# Patient Record
Sex: FEMALE | Race: WHITE | Employment: UNEMPLOYED | URBAN - METROPOLITAN AREA
[De-identification: names, ages, dates, MRNs, and addresses within clinical notes are randomized per-mention and may not be internally consistent; named-entity substitution may affect disease eponyms.]

---

## 2020-03-21 PROBLEM — M96.1 LUMBAR POST-LAMINECTOMY SYNDROME: Status: ACTIVE | Noted: 2020-03-21

## 2020-10-21 ENCOUNTER — HOSPITAL ENCOUNTER (OUTPATIENT)
Dept: PHYSICAL THERAPY | Age: 60
Setting detail: THERAPIES SERIES
Discharge: HOME | End: 2020-10-21
Payer: MEDICAID

## 2020-10-21 PROCEDURE — 97110 THERAPEUTIC EXERCISES: CPT

## 2020-10-21 PROCEDURE — 97140 MANUAL THERAPY 1/> REGIONS: CPT

## 2020-10-21 PROCEDURE — 97162 PT EVAL MOD COMPLEX 30 MIN: CPT

## 2020-10-21 PROCEDURE — 97530 THERAPEUTIC ACTIVITIES: CPT

## 2020-10-21 ASSESSMENT — PAIN SCALES - QUEBEC BACK PAIN DISABILITY SCALE
QUEBEC CMS MODIFIER: CJ
RUN ONE BLOCK OR 100M: 5
QUEBEC DISABILITY INDEX: 20-39%
REACH UP TO HIGH SHELVES: 0
CLIMB ONE FLIGHT OF STAIRS: 1
TAKE FOOD OUT OF THE REFRIGERATOR: 0
LIFT AND CARRY A HEAVY SUITCASE: 1
MOVE A CHAIR: 1
SIT IN A CHAIR FOR SEVERAL HOURS: 1
THROW A BALL: 0
SLEEP THROUGH THE NIGHT: 0
STAND UP FOR 20 TO 30 MINUTES: 2
MAKE YOUR BED: 0
RIDE IN A CAR: 0
CARRY TWO BAGS OF GROCERIES: 1
WALK A FEW BLOCKS OR 300 TO 400M: 4
BEND OVER TO CLEAN THE BATHTUB: 2
TOTAL SCORE: 22
PUT ON SOCKS OR PANYHOSE: 1
WALK SEVERAL KILOMETERS  OR MILES: 1
TURN OVER IN BED: 1
PULL OR PUSH HEAVY DOORS: 0
GET OUT OF BED: 1

## 2020-10-21 NOTE — FLOWSHEET NOTE
Discussed purpose, risks and benefits of PT with pt who is agreeable to POC. Home Exercise Program:    Instructed patient on an HEP. Patient demonstrated exercises correctly. Handout with pictures and # of reps/sets was given. Exercises are listed above. Manual Treatments:   STM to lower Lumbar paraspinals- mainly R  Lumbar PA Mobilizations  B Hip Distraction Mobilization    Modalities:     Progression Towards Functional goals:  [] Patient is progressing as expected towards functional goals listed. [] Progression is slowed due to complexities listed. [] Progression has been slowed due to co-morbidities. [x] Plan just implemented, too soon to assess goals progression  [] Other:    Charges: Therapeutic Exercise:  [x] (26965) Provided verbal/tactile cueing for activities to restore or maintain strength, flexibility, endurance, ROM for improvements with self-care, mobility, lifting and ambulation. Neuromuscular Re-Education  [] (80316) Provided verbal/tactile cueing for activities to restore or maintain balance, coordination, kinesthetic sense, posture, motor skill, proprioception for self-care, mobility, lifting, and ambulation. Therapeutic Activities:    [x] (73296) Provided verbal/tactile cueing to address functional limitations related to loss of mobility, strength, balance, and coordination.      Gait Training:  [] (18273) Provided training and instruction to the patient for proper postural muscle recruitment and positioning with ambulation re-education     Home Exercise Program:    [x] (96432) Reviewed/Progressed HEP activities related to strengthening, flexibility, endurance, ROM for functional self-care, mobility, lifting and ambulation   [] (86957) Reviewed/Progressed HEP activities related to improving balance, coordination, kinesthetic sense, posture, motor skill, proprioception for self-care, mobility, lifting, and ambulation      Manual Treatments:  Michele Dias / Amina Mccann / RobbyMobs: G-I, II, III, IV / Manipulation / MLD  [x] (22350) Provided manual therapy to mobilize  soft tissue/joints/fluid for the purpose of modulating pain, promoting relaxation, increasing ROM, reducing/eliminating soft tissue swelling/inflammation/restriction, improving soft tissue extensibility and allowing for proper ROM for normal function with self- care, mobility, lifting and ambulation. Timed Code Treatment Minutes: 39   Total Treatment Minutes: 60     [] EVAL (LOW) 02450   [x] EVAL (MOD) 36938   [] EVAL (HIGH) 36991   [] RE-EVAL   [x] TE (39289) x     [] Aquatic (44828) x  [] NMR (54942)   x  [] Aquatic Group (60491) x  [x] Manual (02564) x    [] Ultrasound (37210) x  [x] TA (79944) x  [] Mech Traction (85877)  [] Ionto (68325)           [] ES (un) (36643):   [] Vasopump (40738) [] Other:      Assessment  [x] Patient tolerated treatment well [] Patient limited by fatigue  [] Patient limited by pain  [] Patient limited by other medical complications  [] Other:     Prognosis: [x] Good [] Fair  [] Poor    Goals:    Short term goals  Time Frame for Short term goals: 3 Weeks  Short term goal 1: Pt will show independence in HEP  Short term goal 2: Pt will show pain free lumbar flexion AROM to make ADLs easier      Long term goals  Time Frame for Long term goals : 6 Weeks  Long term goal 1: Pt will report  <3/10 back pain consistently so she can return to walking 4 blocks around the neighborhood and complete all ADLs     Patient Requires Follow-up: [] Yes  [] No    Plan:   [] Continue per plan of care [] Alter current plan (see comments)  [x] Plan of care initiated [] Hold pending MD visit [] Discharge  Note: If patient does not return for scheduled/ recommended follow up visits, this note will serve as a discharge from care along with most recent update on progress.     Plan for Next Session:      Electronically signed by:  Raimundo Lucia, PT

## 2020-10-21 NOTE — PROGRESS NOTES
Physical Therapy  Initial Assessment  Date: 10/21/2020  Patient Name: Franklin Lyn  MRN: [id number]  : 1960     Treatment Diagnosis: Pain. Muscle tightness    Restrictions  Fall Risk- low    Subjective   General  Chart Reviewed: Yes  Referring Practitioner: Dr. Marion Louis  Referral Date : 20  Diagnosis: M54.9 (ICD-10-CM) - Back pain, unspecified back location, unspecified back pain laterality, unspecified chronicity  PT Visit Information  Onset Date: 10/10/12  PT Insurance Information: [address]  Subjective  Subjective: Pt states she has a history of back pain. Had back surgery in  due to ruptured disc that was pushing on the disc. States after that she was doing great within 3 months. Pt has worked last 8 years in [employer] doing [address] [employer] work and back pain has progressively worsened for the period of time. Had MRI that showed two bulging discs, thinning of a disk, OA, and slight scoliosis and DDD. Pain is 5/10. States she used to be able to walk around the neighborhood and now can only walk a block before the pain starts. Pain is in her low back and into the bottom of her butt on both sides. Also gets some pain in the front, side of his hips. Denies bowel or bladder problems. Denies LE weakness. Goal is to strengthen bones and tendons. Walking aggravates the back the most, sitting seems to help the back pain.      Vision/Hearing  Vision  Vision: Within Functional Limits  Hearing  Hearing: Within functional limits    Orientation  Orientation  Overall Orientation Status: Within Normal Limits    Objective     Observation/Palpation  Palpation: Mild tenderness  Observation: Pt ambulating with normal gait    AROM RLE (degrees)  RLE AROM: WNL  AROM LLE (degrees)  LLE AROM : WNL  Spine  Lumbar: AROM: WNL *Mild to moderate pain with lumbar flexion, pt reported \"felt good\" with extension  Special Tests: LE DTR: WNL  Joint Mobility  Spine: Pelvic Alignment and Leg length are WNL    Strength RLE  Strength RLE: WNL  Strength LLE  Strength LLE: WNL     Additional Measures  Flexibility: 90/90 HS: Moderate tightness bilaterally  Sensation  Overall Sensation Status: WNL           Assessment   Conditions Requiring Skilled Therapeutic Intervention  Body structures, Functions, Activity limitations: Decreased functional mobility ; Decreased ADL status; Decreased ROM; Increased pain  Assessment: PLOF- mild back pain that is not affecting ADLs. CLOF- moderate amounts of low back pain affecting ability to stand or walk for any period of time to complete ADLs  Treatment Diagnosis: Pain.  Muscle tightness  Prognosis: Good  Decision Making: Medium Complexity  REQUIRES PT FOLLOW UP: Yes         Plan   Plan  Times per week: 2  Times per day: Daily  Plan weeks: 6  Current Treatment Recommendations: Strengthening, Home Exercise Program, ROM, Manual Therapy - Soft Tissue Mobilization, Manual Therapy - Joint Manipulation, Pain Management    OutComes Score  [place] Total Score: 22 (10/21/20 1606)                                             Goals  Short term goals  Time Frame for Short term goals: 3 Weeks  Short term goal 1: Pt will show independence in HEP  Short term goal 2: Pt will show pain free lumbar flexion AROM to make ADLs easier  Long term goals  Time Frame for Long term goals : 6 Weeks  Long term goal 1: Pt will report  <3/10 back pain consistently so she can return to walking 4 blocks around the neighborhood and complete all ADLs  Patient Goals   Patient goals : \"to help strengthen bones or tendons, prevent need for surgery\"       Therapy Time   Individual Concurrent Group Co-treatment   Time In           Time Out           Minutes                   Naukl cruz, PT

## 2020-10-21 NOTE — PLAN OF CARE
Outpatient Physical Therapy  [] [facility]    Phone: [phone]   Fax: [phone]   [x] [facility]  Phone: [phone]              Fax: [phone]  [] [facility]   Phone: [phone]   Fax: [phone]     To: Referring Practitioner: Dr. Alvino Weiner      Patient: Debbie Leary   : 1960   MRN: [id number]  Evaluation Date: 10/21/2020      Diagnosis Information:  · Diagnosis: M54.9 (ICD-10-CM) - Back pain, unspecified back location, unspecified back pain laterality, unspecified chronicity   · Treatment Diagnosis: Pain. Muscle tightness     Physical Therapy Certification/Re-Certification Form  Dear Dr. Alvino Weiner,  The following patient has been evaluated for physical therapy services and for therapy to continue, Medicare requires monthly physician review of the treatment plan. Please review the attached evaluation and/or summary of the patient's plan of care, and verify that you agree therapy should continue by signing the attached document and sending it back to our office. Plan of Care/Treatment to date:  [x] Therapeutic Exercise    [x] Modalities:  [x] Therapeutic Activity     [x] Ultrasound  [] Electrical Stimulation  [] Gait Training      [] Cervical Traction [] Lumbar Traction  [] Neuromuscular Re-education    [x] Cold/hotpack [] Iontophoresis   [x] Instruction in HEP     Other:  [x] Manual Therapy      []             [] Aquatic Therapy      []           ? Frequency/Duration:  # Days per week: [] 1 day # Weeks: [] 1 week [] 5 weeks     [x] 2 days? [] 2 weeks [x] 6 weeks     [] 3 days   [] 3 weeks [] 7 weeks     [] 4 days   [] 4 weeks [] 8 weeks    Rehab Potential: [] Excellent [x] Good [] Fair  [] Poor       Electronically signed by:  Meliza Live PT      If you have any questions or concerns, please don't hesitate to call.   Thank you for your referral.      Physician Signature:________________________________Date:__________________  By signing above, therapists plan is approved by physician

## 2020-10-28 ENCOUNTER — HOSPITAL ENCOUNTER (OUTPATIENT)
Dept: PHYSICAL THERAPY | Age: 60
Setting detail: THERAPIES SERIES
Discharge: HOME | End: 2020-10-28
Payer: MEDICAID

## 2020-10-28 PROCEDURE — 97110 THERAPEUTIC EXERCISES: CPT

## 2020-10-28 PROCEDURE — 97140 MANUAL THERAPY 1/> REGIONS: CPT

## 2020-10-28 NOTE — FLOWSHEET NOTE
10\"    Piriformis Stretch 3 x 30\" B                        HEP     SKTC, Piriformis, and HS Stretch      PPT     PPT with [facility]  10/28                    Other Therapeutic Activities:      Home Exercise Program:      Manual Treatments:   STM to lower Lumbar paraspinals- mainly R  Lumbar PA Mobilizations  B Hip Distraction Mobilization    Modalities:     Progression Towards Functional goals:  [] Patient is progressing as expected towards functional goals listed. [] Progression is slowed due to complexities listed. [] Progression has been slowed due to co-morbidities. [x] Plan just implemented, too soon to assess goals progression  [] Other:    Charges: Therapeutic Exercise:  [x] (19114) Provided verbal/tactile cueing for activities to restore or maintain strength, flexibility, endurance, ROM for improvements with self-care, mobility, lifting and ambulation. Neuromuscular Re-Education  [] (43529) Provided verbal/tactile cueing for activities to restore or maintain balance, coordination, kinesthetic sense, posture, motor skill, proprioception for self-care, mobility, lifting, and ambulation. Therapeutic Activities:    [x] (30820) Provided verbal/tactile cueing to address functional limitations related to loss of mobility, strength, balance, and coordination.      Gait Training:  [] (89134) Provided training and instruction to the patient for proper postural muscle recruitment and positioning with ambulation re-education     Home Exercise Program:    [x] (34917) Reviewed/Progressed HEP activities related to strengthening, flexibility, endurance, ROM for functional self-care, mobility, lifting and ambulation   [] (26816) Reviewed/Progressed HEP activities related to improving balance, coordination, kinesthetic sense, posture, motor skill, proprioception for self-care, mobility, lifting, and ambulation      Manual Treatments:  MFR / STM / Oscillations-Mobs:  G-I, II, III, IV / Manipulation / MLD  [x] (10397) Provided manual therapy to mobilize  soft tissue/joints/fluid for the purpose of modulating pain, promoting relaxation, increasing ROM, reducing/eliminating soft tissue swelling/inflammation/restriction, improving soft tissue extensibility and allowing for proper ROM for normal function with self- care, mobility, lifting and ambulation. Timed Code Treatment Minutes: 45   Total Treatment Minutes: 45     [] EVAL (LOW) 45720   [x] EVAL (MOD) 90593   [] EVAL (HIGH) 17791   [] RE-EVAL   [x] TE (53207) x     [] Aquatic (07127) x  [] NMR (75690)   x  [] Aquatic Group (08878) x  [x] Manual (18541) x    [] Ultrasound (92444) x  [x] TA (73487) x  [] Mech Traction (00970)  [] Ionto (60173)           [] ES (un) (59336):   [] Vasopump (91408) [] Other:      Assessment  [x] Patient tolerated treatment well [] Patient limited by fatigue  [] Patient limited by pain  [] Patient limited by other medical complications  [] Other:     Prognosis: [x] Good [] Fair  [] Poor    Goals:    Short term goals  Time Frame for Short term goals: 3 Weeks  Short term goal 1: Pt will show independence in HEP  Short term goal 2: Pt will show pain free lumbar flexion AROM to make ADLs easier      Long term goals  Time Frame for Long term goals : 6 Weeks  Long term goal 1: Pt will report  <3/10 back pain consistently so she can return to walking 4 blocks around the neighborhood and complete all ADLs     Patient Requires Follow-up: [] Yes  [] No    Plan:   [x] Continue per plan of care [] Alter current plan (see comments)  [] Plan of care initiated [] Hold pending MD visit [] Discharge  Note: If patient does not return for scheduled/ recommended follow up visits, this note will serve as a discharge from care along with most recent update on progress.     Plan for Next Session:    Manual Above  Core strengthening  Modalities as needed    Electronically signed by:  Patty Cornejo PT

## 2020-10-30 ENCOUNTER — HOSPITAL ENCOUNTER (OUTPATIENT)
Dept: PHYSICAL THERAPY | Age: 60
Setting detail: THERAPIES SERIES
Discharge: HOME | End: 2020-10-30
Payer: MEDICAID

## 2020-10-30 NOTE — FLOWSHEET NOTE
Physical Therapy  Cancellation/No-show Note  Patient Name:  Sonia Neal  :  1960   Date:  10/30/2020  Cancelled visits to date: 1  No-shows to date: 0    For today's appointment patient:  [x]  Cancelled  []  Rescheduled appointment  []  No-show     Reason given by patient:  [x]  Patient ill  []  Conflicting appointment  []  No transportation    []  Conflict with work  []  No reason given  []  Other:     Comments:      Electronically signed by:  Andrea Mendoza PTA

## 2020-11-03 ENCOUNTER — HOSPITAL ENCOUNTER (OUTPATIENT)
Dept: PHYSICAL THERAPY | Age: 60
Setting detail: THERAPIES SERIES
Discharge: HOME | End: 2020-11-03
Payer: MEDICAID

## 2020-11-03 PROCEDURE — 97140 MANUAL THERAPY 1/> REGIONS: CPT

## 2020-11-03 PROCEDURE — 97110 THERAPEUTIC EXERCISES: CPT

## 2020-11-03 NOTE — FLOWSHEET NOTE
Physical Therapy Daily Treatment Note  Date:  11/3/2020    Patient Name:  Tanvi Ochoa    :  1960  MRN: [id number]    Restrictions/Precautions:     Pertinent Medical History: HTN, Chronic Bronchitis  Medical/Treatment Diagnosis Information:  · Diagnosis: M54.9 (ICD-10-CM) - Back pain, unspecified back location, unspecified back pain laterality, unspecified chronicity  · Treatment Diagnosis: Pain. Muscle tightness    Insurance/Certification information:  PT Insurance Information: [address] (pending [facility] # [id number])  Physician Information:  Referring Practitioner: Dr. Emigdio Ortiz of care signed (Y/N):      Visit# / total visits:  3/12  Pain level: 3/10     Functional Outcomes Measure:  Test: [place]  Score:22 (Xenia Og)    Progress Note: []  Yes  []  No  Next due by: Visit #10      History of Injury:  Subjective: Pt states she has a history of back pain. Had back surgery in  due to ruptured disc that was pushing on the disc. States after that she was doing great within 3 months. Pt has worked last 8 years in [employer] doing [address] work and back pain has progressively worsened for the period of time. Had MRI that showed two bulging discs, thinning of a disk, OA, and slight scoliosis and DDD. Pain is 5/10. States she used to be able to walk around the neighborhood and now can only walk a block before the pain starts. Pain is in her low back and into the bottom of her butt on both sides. Also gets some pain in the front, side of his hips. Denies bowel or bladder problems. Denies LE weakness. Goal is to strengthen bones and tendons. Walking aggravates the back the most, sitting seems to help the back pain. Subjective:     10/28/20: States she is feeling a little better, exercises seem to be helping. States she was able to walk around the block. 20: Patient reports she is able to walk a little longer before her symptoms in her back increases.     Objective:   Observation:    Test self-care, mobility, lifting, and ambulation      Manual Treatments:  MFR / STM / Oscillations-Mobs:  G-I, II, III, IV / Manipulation / MLD  [x] (73115) Provided manual therapy to mobilize  soft tissue/joints/fluid for the purpose of modulating pain, promoting relaxation, increasing ROM, reducing/eliminating soft tissue swelling/inflammation/restriction, improving soft tissue extensibility and allowing for proper ROM for normal function with self- care, mobility, lifting and ambulation. Timed Code Treatment Minutes: 45   Total Treatment Minutes: 45     [] EVAL (LOW) 86945   [] EVAL (MOD) 26425   [] EVAL (HIGH) 04196   [] RE-EVAL   [x] TE (50795) x 1    [] Aquatic (39784) x  [] NMR (71876)   x  [] Aquatic Group (75101) x  [x] Manual (71720) x 2   [] Ultrasound (34796) x  [] TA (37368) x  [] Mech Traction (29702)  [] Ionto (89713)           [] ES (un) (42363):   [] Vasopump (00840) [] Other:      Assessment  [x] Patient tolerated treatment well [] Patient limited by fatigue  [] Patient limited by pain  [] Patient limited by other medical complications  [] Other:     Prognosis: [x] Good [] Fair  [] Poor    Goals:    Short term goals  Time Frame for Short term goals: 3 Weeks  Short term goal 1: Pt will show independence in HEP  Short term goal 2: Pt will show pain free lumbar flexion AROM to make ADLs easier      Long term goals  Time Frame for Long term goals : 6 Weeks  Long term goal 1: Pt will report  <3/10 back pain consistently so she can return to walking 4 blocks around the neighborhood and complete all ADLs     Patient Requires Follow-up: [] Yes  [] No    Plan:   [x] Continue per plan of care [] Alter current plan (see comments)  [] Plan of care initiated [] Hold pending MD visit [] Discharge  Note: If patient does not return for scheduled/ recommended follow up visits, this note will serve as a discharge from care along with most recent update on progress.     Plan for Next Session:    Manual Above  Core strengthening  Modalities as needed    Electronically signed by:  Aurora Dunne, PTA

## 2020-12-08 ENCOUNTER — HOSPITAL ENCOUNTER (EMERGENCY)
Age: 60
Discharge: HOME | End: 2020-12-09
Attending: EMERGENCY MEDICINE
Payer: MEDICAID

## 2020-12-08 PROCEDURE — 99283 EMERGENCY DEPT VISIT LOW MDM: CPT

## 2020-12-08 RX ORDER — DIAZEPAM 5 MG/1
5 TABLET ORAL EVERY 6 HOURS PRN
COMMUNITY
End: 2022-02-21

## 2020-12-09 ENCOUNTER — APPOINTMENT (OUTPATIENT)
Dept: CT IMAGING | Age: 60
End: 2020-12-09
Payer: MEDICAID

## 2020-12-09 ENCOUNTER — APPOINTMENT (OUTPATIENT)
Dept: GENERAL RADIOLOGY | Age: 60
End: 2020-12-09
Payer: MEDICAID

## 2020-12-09 VITALS
SYSTOLIC BLOOD PRESSURE: 142 MMHG | OXYGEN SATURATION: 95 % | BODY MASS INDEX: 30.05 KG/M2 | RESPIRATION RATE: 17 BRPM | TEMPERATURE: 97.1 F | HEART RATE: 62 BPM | WEIGHT: 180.34 LBS | DIASTOLIC BLOOD PRESSURE: 60 MMHG | HEIGHT: 65 IN

## 2020-12-09 LAB
EKG ATRIAL RATE: 48 BPM
EKG DIAGNOSIS: NORMAL
EKG P AXIS: 30 DEGREES
EKG P-R INTERVAL: 164 MS
EKG Q-T INTERVAL: 436 MS
EKG QRS DURATION: 80 MS
EKG QTC CALCULATION (BAZETT): 389 MS
EKG R AXIS: 17 DEGREES
EKG T AXIS: 54 DEGREES
EKG VENTRICULAR RATE: 48 BPM

## 2020-12-09 PROCEDURE — 71045 X-RAY EXAM CHEST 1 VIEW: CPT

## 2020-12-09 PROCEDURE — 93010 ELECTROCARDIOGRAM REPORT: CPT | Performed by: INTERNAL MEDICINE

## 2020-12-09 PROCEDURE — 70450 CT HEAD/BRAIN W/O DYE: CPT

## 2020-12-09 PROCEDURE — 93005 ELECTROCARDIOGRAM TRACING: CPT | Performed by: EMERGENCY MEDICINE

## 2020-12-09 ASSESSMENT — ENCOUNTER SYMPTOMS
COLOR CHANGE: 0
SHORTNESS OF BREATH: 0
CONSTIPATION: 0
EYE ITCHING: 0
EYE DISCHARGE: 0
VOMITING: 0
COUGH: 0
ABDOMINAL PAIN: 0

## 2020-12-09 NOTE — ED PROVIDER NOTES
[address] [facility]      Pt Name: Marjan Hobbs  MRN: [id number]  [place] 1960  Date of evaluation: 12/8/2020  Provider: Karlos Lopez MD    CHIEF COMPLAINT       Chief Complaint   Patient presents with    Hypertension     pt with HTN at [facility]/  207/29.  systolic at home [phone] at home       [address] [facility]    Marjan Hobbs is a 64 y.o. female who presents to the emergency department with HTN. States noted her blood pressure in the 200s today with mild 1/10 dull headache. Took extra dose of her Metoprolol (total of 200mg) and came to ER. Has no complaints currently. Headaches resolved. Nursing Notes were reviewed. Including nursing noted for FM, Surgical History, Past Medical History, Social History, vitals, and allergies; agree with all. REVIEW OF SYSTEMS       Review of Systems   Constitutional: Negative for diaphoresis and unexpected weight change. HENT: Negative for congestion and dental problem. Eyes: Negative for discharge and itching. Respiratory: Negative for cough and shortness of breath. Cardiovascular: Negative for chest pain and leg swelling. Gastrointestinal: Negative for abdominal pain, constipation and vomiting. Endocrine: Negative for cold intolerance and heat intolerance. Genitourinary: Negative for vaginal bleeding, vaginal discharge and vaginal pain. Musculoskeletal: Negative for neck pain and neck stiffness. Skin: Negative for color change and pallor. Neurological: Negative for tremors and weakness. Psychiatric/Behavioral: Negative for agitation and behavioral problems. Except as noted above the remainder of the review of systems was reviewed and negative.      PAST MEDICAL HISTORY     Past Medical History:   Diagnosis Date    Hypertension     Wears glasses     reading       SURGICAL HISTORY       Past Surgical History:   Procedure Laterality Date    BACK SURGERY  1998    ruptured DAYS FOLLOWED BY 1 TAB TWICE DAILY X4 DAYS FOLLOWED BY 2 TABS TWICE DAILY, Disp-56 tablet, R-1Normal       !! - Potential duplicate medications found. Please discuss with provider. ALLERGIES     Patient has no known allergies.     FAMILY HISTORY        Family History   Problem Relation Age of Onset    Cancer Mother         colon    Early Death Father         killed in a fire       SOCIAL HISTORY       Social History     Socioeconomic History    Marital status: Single     Spouse name: Not on file    Number of children: Not on file    Years of education: Not on file    Highest education level: Not on file   Occupational History    Not on file   Social Needs    Financial resource strain: Not on file    Food insecurity     Worry: Not on file     Inability: Not on file    Transportation needs     Medical: Not on file     Non-medical: Not on file   Tobacco Use    Smoking status: Current Every Day Smoker     Packs/day: 0.50     Years: 35.00     Pack years: 17.50    Smokeless tobacco: Never Used   Substance and Sexual Activity    Alcohol use: Yes     Comment: occ    Drug use: No    Sexual activity: Yes     Partners: Male   Lifestyle    Physical activity     Days per week: Not on file     Minutes per session: Not on file    Stress: Not on file   Relationships    Social connections     Talks on phone: Not on file     Gets together: Not on file     Attends Confucianist service: Not on file     Active member of club or organization: Not on file     Attends meetings of clubs or organizations: Not on file     Relationship status: Not on file    Intimate partner violence     Fear of current or ex partner: Not on file     Emotionally abused: Not on file     Physically abused: Not on file     Forced sexual activity: Not on file   Other Topics Concern    Not on file   Social History Narrative    Not on file       PHYSICAL EXAM       ED Triage Vitals   BP Temp Temp Source Pulse Resp SpO2 Height Weight 12/08/20 2251 12/08/20 2248 12/08/20 2248 12/08/20 2248 12/08/20 2248 12/08/20 2248 12/08/20 2245 12/08/20 2245   (!) 198/84 97.1 °F (36.2 °C) Tympanic 62 17 95 % 5' 5\" (1.651 m) 180 lb 5.4 oz (81.8 kg)       Physical Exam  Vitals signs and nursing note reviewed. Constitutional:       General: She is not in acute distress. Appearance: She is well-developed. She is not ill-appearing, toxic-appearing or diaphoretic. HENT:      Head: Normocephalic and atraumatic. Right Ear: External ear normal.      Left Ear: External ear normal.   Eyes:      General:         Right eye: No discharge. Left eye: No discharge. Conjunctiva/sclera: Conjunctivae normal.      Pupils: Pupils are equal, round, and reactive to light. Neck:      Musculoskeletal: Normal range of motion and neck supple. Cardiovascular:      Rate and Rhythm: Normal rate and regular rhythm. Heart sounds: No murmur. Pulmonary:      Effort: Pulmonary effort is normal. No respiratory distress. Breath sounds: Normal breath sounds. No wheezing or rales. Abdominal:      General: Bowel sounds are normal. There is no distension. Palpations: Abdomen is soft. There is no mass. Tenderness: There is no abdominal tenderness. There is no guarding or rebound. Genitourinary:     Comments: Deferred  Musculoskeletal: Normal range of motion. General: No deformity. Skin:     General: Skin is warm. Findings: No erythema or rash. Neurological:      Mental Status: She is alert and oriented to person, place, and time. She is not disoriented. Cranial Nerves: No cranial nerve deficit. Motor: No atrophy or abnormal muscle tone. Coordination: Coordination normal.   Psychiatric:         Behavior: Behavior normal.         Thought Content:  Thought content normal.         DIAGNOSTIC RESULTS     EKG: All EKG's are interpreted by the Emergency Department Physician who either signs or Co-signs this chart in the follow-up care as directed. Patient understands to return immediately for worsening/change in symptoms. CRITICAL CARE TIME   Total Critical Caretime was 21 minutes, excluding separately reportable procedures. There was a high probability of clinically significant/life threatening deterioration in the patient's condition which required my urgent intervention. PROCEDURES:  Unlessotherwise noted below, none    FINAL IMPRESSION      1.  Essential hypertension          DISPOSITION/PLAN   DISPOSITION Decision To Discharge 12/09/2020 01:00:36 AM    PATIENT REFERRED TO:  Tavon Aldrich MD  [address] [phone] [facility] [address]  [phone]    Call today        DISCHARGE MEDICATIONS:  Discharge Medication List as of 12/9/2020  1:20 AM             (Please note that portions ofthis note were completed with a voice recognition program.  Efforts were made to edit the dictations but occasionally words are mis-transcribed.)    Layla Marion MD(electronically signed)  Attending Emergency Physician        Layla Marion MD  12/09/20 [id number]

## 2020-12-30 ENCOUNTER — TELEPHONE (OUTPATIENT)
Dept: EMERGENCY DEPT | Age: 60
End: 2020-12-30

## 2020-12-30 NOTE — TELEPHONE ENCOUNTER
[facility] ([facility]) reviewed patient chart information before conducting a telephonic wellness check.

## 2022-01-03 ENCOUNTER — OFFICE VISIT (OUTPATIENT)
Dept: ORTHOPEDIC SURGERY | Age: 62
End: 2022-01-03
Payer: MEDICAID

## 2022-01-03 VITALS — WEIGHT: 185 LBS | HEIGHT: 65 IN | BODY MASS INDEX: 30.82 KG/M2

## 2022-01-03 DIAGNOSIS — M51.36 LUMBAR DEGENERATIVE DISC DISEASE: Primary | ICD-10-CM

## 2022-01-03 PROCEDURE — G8427 DOCREV CUR MEDS BY ELIG CLIN: HCPCS | Performed by: ORTHOPAEDIC SURGERY

## 2022-01-03 PROCEDURE — 99204 OFFICE O/P NEW MOD 45 MIN: CPT | Performed by: ORTHOPAEDIC SURGERY

## 2022-01-03 PROCEDURE — 4004F PT TOBACCO SCREEN RCVD TLK: CPT | Performed by: ORTHOPAEDIC SURGERY

## 2022-01-03 PROCEDURE — 3017F COLORECTAL CA SCREEN DOC REV: CPT | Performed by: ORTHOPAEDIC SURGERY

## 2022-01-03 PROCEDURE — G8417 CALC BMI ABV UP PARAM F/U: HCPCS | Performed by: ORTHOPAEDIC SURGERY

## 2022-01-03 PROCEDURE — G8484 FLU IMMUNIZE NO ADMIN: HCPCS | Performed by: ORTHOPAEDIC SURGERY

## 2022-01-03 NOTE — PROGRESS NOTES
New Patient: LUMBAR SPINE    Referring Provider:  Randolph Justin MD    CHIEF COMPLAINT:    Chief Complaint   Patient presents with    Back Pain     lumbar       HISTORY OF PRESENT ILLNESS:     Ms. Odin Peralta is a pleasant 63 y.o. female, s/p previous lumbar surgery in 1998 by Dr. Tran Ro, here for consultation regarding her LBP and right leg pain. She states her pain began insidiously about 12 years ago. Her pain has steadily increased since then. She rates her back pain 4/10 and leg pain 4/10. She describes the pain as aching. Pain is worse with prolonged sitting, standing, or walking, and improved some with lying down. The leg pain radiates down the posterior aspect of her leg to her right thigh. She notes intermittent numbness and tingling in her right foot. She denies weakness of her legs and denies saddle numbness or bowel or bladder dysfunction. The pain occasionally disrupts her sleep. She is in pain management with Dr. Dorota Quiñonez. She tried PT last year without relief. She notes multiple epidural injections in the past without long-lasting relief. She is currently in the process of being scheduled for aquatic therapy.     Current/Past Treatment:   · Physical Therapy: Yes without substantial improvement  · Chiropractic:  Yes, temporarily helpful  · Injection:  Multiple NATHALY without relief   · Medications:  Mobic, Oxycodone 10mg, Lyrica    Past Medical History:   Past Medical History:   Diagnosis Date    Hypertension     Wears glasses     reading        Past Surgical History:     Past Surgical History:   Procedure Laterality Date    BACK SURGERY  1998    ruptured disc    CHOLECYSTECTOMY, LAPAROSCOPIC  4/14/14       Current Medications:     Current Outpatient Medications:     meloxicam (MOBIC) 15 MG tablet, TAKE ONE TABLET BY MOUTH DAILY AS NEEDED FOR PAIN, Disp: 30 tablet, Rfl: 0    amLODIPine (NORVASC) 5 MG tablet, TAKE ONE TABLET BY MOUTH DAILY, Disp: 30 tablet, Rfl: 3    albuterol sulfate  (90 Base) MCG/ACT inhaler, INHALE 2 PUFF(S) BY MOUTH FOUR TIMES A DAY AS NEEDED FOR WHEEZING, Disp: 18 g, Rfl: 0    hydroCHLOROthiazide (HYDRODIURIL) 25 MG tablet, TAKE ONE TABLET BY MOUTH EVERY MORNING, Disp: 30 tablet, Rfl: 5    metoprolol succinate (TOPROL XL) 100 MG extended release tablet, TAKE ONE TABLET BY MOUTH DAILY, Disp: 90 tablet, Rfl: 1    vitamin D (ERGOCALCIFEROL) 1.25 MG (61834 UT) CAPS capsule, TAKE ONE CAPSULE BY MOUTH ONCE WEEKLY, Disp: 4 capsule, Rfl: 5    pregabalin (LYRICA) 150 MG capsule, , Disp: , Rfl:     tiotropium (SPIRIVA HANDIHALER) 18 MCG inhalation capsule, Inhale 1 capsule into the lungs daily, Disp: 30 capsule, Rfl: 5    diazePAM (VALIUM) 5 MG tablet, Take 5 mg by mouth every 6 hours as needed for Anxiety. , Disp: , Rfl:     furosemide (LASIX) 20 MG tablet, Take 1 tablet by mouth daily, Disp: 30 tablet, Rfl: 3    tiotropium (SPIRIVA HANDIHALER) 18 MCG inhalation capsule, Inhale 1 capsule into the lungs daily, Disp: 90 capsule, Rfl: 1    oxyCODONE HCl (OXY-IR) 10 MG immediate release tablet, TAKE 1 TO 2 TABLETS BY MOUTH EVERY 4 HOURS. LIMIT OF 6 PER DAY, Disp: , Rfl: 0    Allergies:  Patient has no known allergies. Social History:    reports that she has been smoking. She has a 17.50 pack-year smoking history. She has never used smokeless tobacco. She reports current alcohol use. She reports that she does not use drugs.     Family History:   Family History   Problem Relation Age of Onset    Cancer Mother         colon    Early Death Father         killed in a fire       REVIEW OF SYSTEMS: Full ROS noted & scanned   CONSTITUTIONAL: Denies unexplained weight loss, fevers, chills or fatigue  NEUROLOGICAL: Denies unsteady gait or progressive weakness  MUSCULOSKELETAL: Denies joint swelling or redness  PSYCHOLOGICAL: Denies anxiety, depression   SKIN: Denies skin changes, delayed healing, rash, itching   HEMATOLOGIC: Denies easy bleeding or bruising  ENDOCRINE: Denies excessive thirst, urination, heat/cold  RESPIRATORY: Denies current dyspnea, cough  GI: Denies nausea, vomiting, diarrhea   : Denies bowel or bladder issues      PHYSICAL EXAM:    Vitals: Height 5' 5\" (1.651 m), weight 185 lb (83.9 kg), last menstrual period 04/04/2010. GENERAL EXAM:  · General Apparence: Patient is adequately groomed with no evidence of malnutrition. · Orientation: The patient is oriented to time, place and person. · Mood & Affect:The patient's mood and affect are appropriate. · Vascular: Examination reveals no swelling tenderness in upper or lower extremities. Good capillary refill. · Lymphatic: The lymphatic examination bilaterally reveals all areas to be without enlargement or induration  · Sensation: Sensation is intact without deficit  · Coordination/Balance: Good coordination. Tandem walking normal.     LUMBAR/SACRAL EXAMINATION:  · Inspection: Local inspection shows no step-off or bruising. Lumbar alignment is normal.  Sagittal and Coronal balance is neutral.      · Palpation:   No evidence of tenderness at the midline. No tenderness bilaterally at the paraspinal or trochanters. There is no step-off or paraspinal spasm. · Range of Motion: Lumbar flexion, extension and rotation are mildly limited due to pain. · Strength:   Strength testing is 5/5 in all muscle groups tested. · Special Tests:   Straight leg raise and crossed SLR negative. Leg length and pelvis level. · Skin: There are no rashes, ulcerations or lesions. · Reflexes: Reflexes are symmetrically 2+ at the patellar and ankle tendons. Clonus absent bilaterally at the feet. · Gait & station: normal, patient ambulates without assistance    · Additional Examinations:   · RIGHT LOWER EXTREMITY: Inspection/examination of the right lower extremity does not show any tenderness, deformity or injury. Range of motion is unremarkable. There is no gross instability. There are no rashes, ulcerations or lesions.  Strength and tone are normal.  · LEFT LOWER EXTREMITY:  Inspection/examination of the left lower extremity does not show any tenderness, deformity or injury. Range of motion is unremarkable. There is no gross instability. There are no rashes, ulcerations or lesions. Strength and tone are normal.    Diagnostic Testing:    I reviewed MRI radiology report of her lumbar spine from 7/31/2020 from [facility]. They show previous L5-S1 laminectomy, with a right lateral disc protrusion and granulation tissue, and a central/right lateral disc protrusion L4-5. Moderate right lateral recess narrowing L4-5 and L5-S1. Impression:   Lumbar spondylosis with radiculopathy    Plan:    We discussed treatment options including observation, additional physical therapy, epidural injections, additional imaging, and additional surgery. She is hopeful to keep additional surgery as the last resort. She will proceed with aquatic therapy as previously scheduled, and will discuss possible medial branch block with Dr. Pascual Perez.

## 2022-03-11 ENCOUNTER — OFFICE VISIT (OUTPATIENT)
Dept: ORTHOPEDIC SURGERY | Age: 62
End: 2022-03-11
Payer: MEDICAID

## 2022-03-11 VITALS — WEIGHT: 185 LBS | HEIGHT: 65 IN | BODY MASS INDEX: 30.82 KG/M2 | RESPIRATION RATE: 16 BRPM

## 2022-03-11 DIAGNOSIS — M25.552 HIP PAIN, BILATERAL: Primary | ICD-10-CM

## 2022-03-11 DIAGNOSIS — M70.71 BURSITIS OF BOTH HIPS, UNSPECIFIED BURSA: ICD-10-CM

## 2022-03-11 DIAGNOSIS — M25.551 HIP PAIN, BILATERAL: Primary | ICD-10-CM

## 2022-03-11 DIAGNOSIS — M70.72 BURSITIS OF BOTH HIPS, UNSPECIFIED BURSA: ICD-10-CM

## 2022-03-11 PROCEDURE — G8417 CALC BMI ABV UP PARAM F/U: HCPCS | Performed by: PHYSICIAN ASSISTANT

## 2022-03-11 PROCEDURE — 4004F PT TOBACCO SCREEN RCVD TLK: CPT | Performed by: PHYSICIAN ASSISTANT

## 2022-03-11 PROCEDURE — 3017F COLORECTAL CA SCREEN DOC REV: CPT | Performed by: PHYSICIAN ASSISTANT

## 2022-03-11 PROCEDURE — G8484 FLU IMMUNIZE NO ADMIN: HCPCS | Performed by: PHYSICIAN ASSISTANT

## 2022-03-11 PROCEDURE — 99213 OFFICE O/P EST LOW 20 MIN: CPT | Performed by: PHYSICIAN ASSISTANT

## 2022-03-11 PROCEDURE — 20610 DRAIN/INJ JOINT/BURSA W/O US: CPT | Performed by: PHYSICIAN ASSISTANT

## 2022-03-11 PROCEDURE — G8427 DOCREV CUR MEDS BY ELIG CLIN: HCPCS | Performed by: PHYSICIAN ASSISTANT

## 2022-03-11 RX ORDER — BUPIVACAINE HYDROCHLORIDE 2.5 MG/ML
2 INJECTION, SOLUTION INFILTRATION; PERINEURAL ONCE
Status: COMPLETED | OUTPATIENT
Start: 2022-03-11 | End: 2022-03-11

## 2022-03-11 RX ORDER — TRIAMCINOLONE ACETONIDE 40 MG/ML
40 INJECTION, SUSPENSION INTRA-ARTICULAR; INTRAMUSCULAR ONCE
Status: COMPLETED | OUTPATIENT
Start: 2022-03-11 | End: 2022-03-11

## 2022-03-11 RX ADMIN — BUPIVACAINE HYDROCHLORIDE 5 MG: 2.5 INJECTION, SOLUTION INFILTRATION; PERINEURAL at 10:33

## 2022-03-11 RX ADMIN — TRIAMCINOLONE ACETONIDE 40 MG: 40 INJECTION, SUSPENSION INTRA-ARTICULAR; INTRAMUSCULAR at 10:34

## 2022-03-11 RX ADMIN — BUPIVACAINE HYDROCHLORIDE 5 MG: 2.5 INJECTION, SOLUTION INFILTRATION; PERINEURAL at 10:34

## 2022-03-15 NOTE — PROGRESS NOTES
This dictation was done with Dragon dictation and may contain mechanical errors related to translation. I have today reviewed with Bakari Caba the clinically relevant, past medical history, medications, allergies, family history, social history, and Review Of Systems form the patients most recent history form & I have documented any details relevant to today's presenting complaints in my history below. Ms. Rigo Titus self-reported past medical history, medications, allergies, family history, social history, and Review Of Systems form has been scanned into the chart under the \"Media\" tab. Subjective:  Bakari Caba is a 63 y.o. who is here complaining of pain in both of her hips. This is been going on for about a year and she does remember specific injury. The left is worse than the right. There is some mild groin pain mainly on the left but none on the right both hips hurt on the lateral aspect. She can register least 6 out of 10 pain. She is also in pain management for some chronic back problems. She was sent for x-rays including AP pelvis and 2 view left and 2 view right hip. Patient Active Problem List   Diagnosis    Lumbar post-laminectomy syndrome           Current Outpatient Medications on File Prior to Visit   Medication Sig Dispense Refill    albuterol sulfate  (90 Base) MCG/ACT inhaler INHALE TWO PUFFS BY MOUTH FOUR TIMES A DAY AS NEEDED FOR WHEEZING 18 g 0    vitamin D (ERGOCALCIFEROL) 1.25 MG (26037 UT) CAPS capsule TAKE ONE CAPSULE BY MOUTH ONCE WEEKLY 4 capsule 5    metoprolol succinate (TOPROL XL) 100 MG extended release tablet TAKE ONE TABLET BY MOUTH DAILY 90 tablet 1    meloxicam (MOBIC) 15 MG tablet TAKE ONE TABLET BY MOUTH DAILY AS NEEDED 30 tablet 0    diazePAM (VALIUM) 5 MG tablet TAKE 1 TABLET BY MOUTH NIGHTLY AS NEEDED FOR ANXIETY OR SLEEP FOR UP TO 10 DAYS.  10 tablet 0    amLODIPine (NORVASC) 5 MG tablet TAKE ONE TABLET BY MOUTH DAILY 30 tablet 3    hydroCHLOROthiazide (HYDRODIURIL) 25 MG tablet TAKE ONE TABLET BY MOUTH EVERY MORNING 30 tablet 5    pregabalin (LYRICA) 150 MG capsule       tiotropium (SPIRIVA HANDIHALER) 18 MCG inhalation capsule Inhale 1 capsule into the lungs daily 30 capsule 5    furosemide (LASIX) 20 MG tablet Take 1 tablet by mouth daily 30 tablet 3    tiotropium (SPIRIVA HANDIHALER) 18 MCG inhalation capsule Inhale 1 capsule into the lungs daily 90 capsule 1    oxyCODONE HCl (OXY-IR) 10 MG immediate release tablet TAKE 1 TO 2 TABLETS BY MOUTH EVERY 4 HOURS. LIMIT OF 6 PER DAY  0     No current facility-administered medications on file prior to visit. Objective:   Resp. rate 16, height 5' 5\" (1.651 m), weight 185 lb (83.9 kg), last menstrual period 04/04/2010. On examination this is a very pleasant 61-year-old female no acute distress she is alert and oriented x3 she does walk with a slight amount of antalgia and has a positive straight leg raise bilaterally. She is comfortable with internal and external rotation but there is some pain that radiates into the groin and the lateral aspect of her hips. She has lateral joint line tenderness especially around the greater trochanteric area and she has weakness to hip abduction strength testing. Neurologically she is intact distally with good distal pulses good dorsiflexion and plantarflexion strength. Neuro exam grossly intact both lower extremities. Intact sensation to light touch. Motor exam 4+ to 5/5 in all major motor groups. Negative Hobbs's sign. Skin is warm, dry and intact with out erythema or significant increased temperature around the knee joint(s). There are no cutaneous lesions or lymphadenopathy present. X-RAYS:  X-rays taken the office today do show mild osteoarthritis of both hips with the greater troches calcification on the right side and joint space narrowing and pincer lesions seen bilateral hip joints.   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 More than 50 % of the time was counseling and coordination of care as indicated above. We did about short and long-term expectations and with her consent she was injected with 1 cc Kenalog and 2 cc of Marcaine into the left and right trochanteric area.   With her stretching strengthening exercise program if she continues to have soreness and pain we will consider an intra-articular injection versus continue to work on her lower lumbar radiculopathy      PROCEDURE NOTE:   Cortisone shots for bursitis of both hips      They will schedule a follow up in 3 weeks

## 2022-03-30 ENCOUNTER — HOSPITAL ENCOUNTER (OUTPATIENT)
Dept: GENERAL RADIOLOGY | Age: 62
Discharge: HOME | End: 2022-03-30
Payer: MEDICAID

## 2022-03-30 ENCOUNTER — HOSPITAL ENCOUNTER (OUTPATIENT)
Age: 62
Discharge: HOME | End: 2022-03-30
Payer: MEDICAID

## 2022-03-30 DIAGNOSIS — R06.00 DYSPNEA, UNSPECIFIED TYPE: ICD-10-CM

## 2022-03-30 PROCEDURE — 71046 X-RAY EXAM CHEST 2 VIEWS: CPT

## 2022-08-30 ENCOUNTER — TELEPHONE (OUTPATIENT)
Dept: PAIN MANAGEMENT | Age: 62
End: 2022-08-30

## 2022-08-30 ENCOUNTER — OFFICE VISIT (OUTPATIENT)
Dept: PAIN MANAGEMENT | Age: 62
End: 2022-08-30
Payer: MEDICAID

## 2022-08-30 VITALS
HEART RATE: 75 BPM | SYSTOLIC BLOOD PRESSURE: 178 MMHG | WEIGHT: 167.6 LBS | BODY MASS INDEX: 27.89 KG/M2 | DIASTOLIC BLOOD PRESSURE: 81 MMHG | OXYGEN SATURATION: 91 %

## 2022-08-30 DIAGNOSIS — Z79.891 ENCOUNTER FOR LONG-TERM OPIATE ANALGESIC USE: ICD-10-CM

## 2022-08-30 DIAGNOSIS — M54.16 LUMBAR RADICULOPATHY: Primary | ICD-10-CM

## 2022-08-30 DIAGNOSIS — G89.4 CHRONIC PAIN SYNDROME: ICD-10-CM

## 2022-08-30 DIAGNOSIS — M47.817 LUMBOSACRAL SPONDYLOSIS WITHOUT MYELOPATHY: ICD-10-CM

## 2022-08-30 DIAGNOSIS — M96.1 FAILED BACK SURGICAL SYNDROME: ICD-10-CM

## 2022-08-30 DIAGNOSIS — Z51.81 ENCOUNTER FOR THERAPEUTIC DRUG MONITORING: ICD-10-CM

## 2022-08-30 PROCEDURE — G8417 CALC BMI ABV UP PARAM F/U: HCPCS | Performed by: NURSE PRACTITIONER

## 2022-08-30 PROCEDURE — 99244 OFF/OP CNSLTJ NEW/EST MOD 40: CPT | Performed by: NURSE PRACTITIONER

## 2022-08-30 PROCEDURE — G8427 DOCREV CUR MEDS BY ELIG CLIN: HCPCS | Performed by: NURSE PRACTITIONER

## 2022-08-30 RX ORDER — OXYCODONE HYDROCHLORIDE 7.5 MG/1
7.5 TABLET ORAL EVERY 6 HOURS PRN
Qty: 112 TABLET | Refills: 0 | Status: SHIPPED | OUTPATIENT
Start: 2022-08-30 | End: 2022-08-30 | Stop reason: SDUPTHER

## 2022-08-30 RX ORDER — NALOXONE HYDROCHLORIDE 4 MG/.1ML
1 SPRAY NASAL PRN
Qty: 1 EACH | Refills: 0 | Status: SHIPPED | OUTPATIENT
Start: 2022-08-30 | End: 2022-09-15

## 2022-08-30 RX ORDER — OXYCODONE HYDROCHLORIDE 10 MG/1
10 TABLET ORAL EVERY 6 HOURS PRN
COMMUNITY
End: 2022-08-30 | Stop reason: ALTCHOICE

## 2022-08-30 ASSESSMENT — ENCOUNTER SYMPTOMS
BACK PAIN: 1
SHORTNESS OF BREATH: 0
ABDOMINAL PAIN: 0
WHEEZING: 0

## 2022-08-30 NOTE — PROGRESS NOTES
HISTORY OF PRESENT ILLNESS:  Ms. Kavita Tracy is a 63 y.o. female presents for consultation at the request of Dr. Gilda Espino. Her presenting problems are low back and right leg pain. She has also been evaluated by Dr. Rogers Benítez and Dr. Munir Solano. Onset of pain began about 7 years ago. She rates the pain 4/10 and describes it as sharp, dull, aching, shooting. Pain is greaterin her low back than her right leg. Pain is made worse by: movement, walking, standing. Activities that have been limited by pain that she otherwise tolerated well are ADLs, working, home exercises, walking. Alternative therapies she haspreviously attempted are surgery, injection treatments, physical therapy. Current treatment regimen has helped relieve about 60% of the pain. Relieving factors of pain include back brace, lying down, medications. Shedenies side effects from the current pain regimen. Patient reports mood is well and happy and sleep patterns are Good. Patient deniesneurological bowel or bladder. Patient denies misusing/abusing her narcotic pain medications or using any illegal drugs. she admits to morning stiffness, denies fatigue and headaches. Patient reports when she was at Dr. Shanice Sweet office he did an injection on her that caused her to be unable to walk she thinks this was a radiofrequency. She did not return to the office after this. Also with history of pain management with Dr. Munir Solano, she was taking Percocet 10 4 times a day patient reports that her Valium tabs are as needed and that she rarely takes the Valium. She states she does not like the way the Valium makes her feel and she only takes it in the middle of an anxiety attack. History of back surgery 1998, reports pain was resolved for several years. She reports pain returned 7 years ago and attributes this to working at [employer] ROS:  Review of Systems   Constitutional:  Negative for fatigue, fever and unexpected weight change. HENT:  Negative for congestion.     Eyes: Negative for visual disturbance. Respiratory:  Negative for shortness of breath and wheezing. Cardiovascular:  Negative for chest pain, palpitations and leg swelling. Gastrointestinal:  Negative for abdominal pain. Genitourinary:  Negative for difficulty urinating and dyspareunia. Musculoskeletal:  Positive for arthralgias, back pain, gait problem and myalgias. Negative for neck pain and neck stiffness. Skin:  Negative for pallor. Neurological:  Negative for dizziness, light-headedness, numbness and headaches. Psychiatric/Behavioral:  Negative for self-injury, sleep disturbance and suicidal ideas. The patient is not nervous/anxious. Physical Exam  Constitutional:       Appearance: Normal appearance. HENT:      Head: Normocephalic and atraumatic. Right Ear: External ear normal.      Left Ear: External ear normal.      Mouth/Throat:      Mouth: Mucous membranes are moist.      Pharynx: Oropharynx is clear. Eyes:      General: No scleral icterus. Extraocular Movements: Extraocular movements intact. Conjunctiva/sclera: Conjunctivae normal.   Cardiovascular:      Rate and Rhythm: Normal rate and regular rhythm. Pulses: Normal pulses. Heart sounds: Normal heart sounds. No murmur heard. No gallop. Pulmonary:      Effort: Pulmonary effort is normal. No respiratory distress. Breath sounds: Normal breath sounds. No wheezing. Abdominal:      General: Abdomen is flat. Palpations: Abdomen is soft. Musculoskeletal:         General: Tenderness (mild bilateral lumbar facet tenderness L4/5, worst pain over right L5/S1 facet) present. No swelling or deformity. Cervical back: Normal range of motion and neck supple. No tenderness. Right lower leg: No edema. Left lower leg: No edema. Skin:     General: Skin is warm and dry. Capillary Refill: Capillary refill takes less than 2 seconds.    Neurological:      Mental Status: She is alert and oriented to person, place, and time. Sensory: Sensory deficit (diminished sensation to pin prick and light touch RLE) present. Motor: Weakness (RLE 4/5, LLE 5/5) present. Gait: Gait abnormal (antalgic-no assist device). Deep Tendon Reflexes: Reflexes normal.   Psychiatric:         Mood and Affect: Mood normal.         Behavior: Behavior normal.         Thought Content: Thought content normal.         Judgment: Judgment normal.      BP (!) 178/81   Pulse 75   Wt 167 lb 9.6 oz (76 kg)   LMP 04/04/2010   SpO2 91%   BMI 27.89 kg/m²      ASSESSMENT:    1. Lumbar radiculopathy    2. Encounter for therapeutic drug monitoring    3. Failed back surgical syndrome    4. Chronic pain syndrome    5. Encounter for long-term opiate analgesic use    6. Lumbosacral spondylosis without myelopathy         Lab Results   Component Value Date    WBC 7.6 12/04/2020    HGB 13.8 12/04/2020    HCT 41.1 12/04/2020    .5 (H) 12/04/2020     12/04/2020     Lab Results   Component Value Date/Time     12/04/2020 03:56 PM    K 4.8 12/04/2020 03:56 PM     12/04/2020 03:56 PM    CO2 27 12/04/2020 03:56 PM    BUN 10 12/04/2020 03:56 PM    CREATININE 0.7 12/04/2020 03:56 PM    GLUCOSE 97 12/04/2020 03:56 PM    CALCIUM 10.4 12/04/2020 03:56 PM      Lab Results   Component Value Date    TSH 1.18 12/04/2020       IMAGING:  Updated 2021 MRI to be obtained from [employer] Results In - 06/08/2016  5:45 PM EDT   HISTORY:    Low back pain. Lumbar spine surgery 1998. COMPARISON:  None   TECHNIQUE:  Noncontrast high field multiplanar multisequence MRI images focused on the lumbar spine. FINDINGS:   LUMBO-SACRAL JUNCTION: Usual anatomy   CONUS:  Unremarkable.   Conus ends at the lower T12  level without compression or abnormal signal.   SAGITTAL CANAL/THECAL SAC DIAMETER (mm) at L1-2, L2-3, L3-4, L4-5, and L5-S1 respectively: 15, 14, 11, 11, 12  (normal is at least 10mm)     LISTHESIS >2mm: None SCOLIOSIS: Mild levoscoliosis centered at L4-5   FRACTURE : None   MARROW: Unremarkable     DISC LEVEL ANALYSIS:   T12-L1:  Unremarkable   L1-2:      Unremarkable   L2-3:      Unremarkable   L3-4:      Mild disc narrowing and spurring. Small facet spurs bilaterally   L4-5:      Moderate disc narrowing and spurring. Broad disc bulge. Posterior focal annular tear. Small facet spurs bilaterally. Mild narrowing left L5 lateral recess. L5-S1:    Severe disc narrowing and spurring. Right laminotomy defect. T1 hyperintense fat packing ventral to the S1 nerve root. SACRUM AND SI JOINTS:  Unremarkable   OTHER:  None     IMPRESSION     Mild levoscoliosis centered at L4-5. Broad disc bulge L4-5 with focal annular tear and mild narrowing left L5 lateral recess. Postop changes on the right at L5-S1 with severe degenerative disc changes L5-S1. PLAN:   -Chronic opiate treatment protocol was discussed withthe patient, informed consent was obtained. -Treatment guidelines were discussed and established  -Risks and benefits of narcotics were addressedwith the patient  - Obtainable long term and short term goals of opioid therapy were reviewed, including pain relief, sleep, psychosocial and physical functioning   -CBT techniques- relaxation therapies such as biofeedback, mindfulness based stress reduction, imagery, cognitive restructuring, problem solving discussed with patient   -Obtain urine Toxicology today  -SOAPP score:2  -ORT:0  -PHQ-9:1  -DC oxycodone 10mg tabs. -Oxyado 7.5mg tabs QID PRN Pain   -Will continue lyrica. She does not need refill today  -F/U 28 days      Controlled Substances Monitoring:   [place] record was obtained and reviewed  for the last one year and no indicators of drug misuse  were found. Any other controlled substance prescriptions  seen on the record have been accounted for, I am aware of the patient receiving these medications.   [place] record will be rechecked as part of office protocol. Last opiate from Dr. Tita Davey 07/28/2022 oxycodone 10mg tabs #120 for 30 days.  Takes 5mg diazepam once daily      MK Lopez

## 2022-09-27 ENCOUNTER — OFFICE VISIT (OUTPATIENT)
Dept: PAIN MANAGEMENT | Age: 62
End: 2022-09-27
Payer: MEDICAID

## 2022-09-27 VITALS
SYSTOLIC BLOOD PRESSURE: 130 MMHG | OXYGEN SATURATION: 94 % | BODY MASS INDEX: 27.26 KG/M2 | WEIGHT: 163.8 LBS | HEART RATE: 75 BPM | DIASTOLIC BLOOD PRESSURE: 80 MMHG

## 2022-09-27 DIAGNOSIS — M54.16 LUMBAR RADICULOPATHY: ICD-10-CM

## 2022-09-27 DIAGNOSIS — M47.817 LUMBOSACRAL SPONDYLOSIS WITHOUT MYELOPATHY: ICD-10-CM

## 2022-09-27 DIAGNOSIS — Z51.81 ENCOUNTER FOR THERAPEUTIC DRUG MONITORING: ICD-10-CM

## 2022-09-27 DIAGNOSIS — Z79.891 ENCOUNTER FOR LONG-TERM OPIATE ANALGESIC USE: ICD-10-CM

## 2022-09-27 DIAGNOSIS — G89.4 CHRONIC PAIN SYNDROME: ICD-10-CM

## 2022-09-27 DIAGNOSIS — M96.1 FAILED BACK SURGICAL SYNDROME: ICD-10-CM

## 2022-09-27 PROCEDURE — 4004F PT TOBACCO SCREEN RCVD TLK: CPT | Performed by: NURSE PRACTITIONER

## 2022-09-27 PROCEDURE — G8427 DOCREV CUR MEDS BY ELIG CLIN: HCPCS | Performed by: NURSE PRACTITIONER

## 2022-09-27 PROCEDURE — G8417 CALC BMI ABV UP PARAM F/U: HCPCS | Performed by: NURSE PRACTITIONER

## 2022-09-27 PROCEDURE — 99213 OFFICE O/P EST LOW 20 MIN: CPT | Performed by: NURSE PRACTITIONER

## 2022-09-27 PROCEDURE — 3017F COLORECTAL CA SCREEN DOC REV: CPT | Performed by: NURSE PRACTITIONER

## 2022-09-27 NOTE — PROGRESS NOTES
Kenny Tyrel  1960  [id number]      HISTORY OF PRESENT ILLNESS: Ms. Konrad Saldivar is a 63 y.o. female returns for a follow up visit for pain management  She has a diagnosis of   1. Encounter for therapeutic drug monitoring    2. Chronic pain syndrome    3. Failed back surgical syndrome    4. Encounter for long-term opiate analgesic use    5. Lumbar radiculopathy    6. Lumbosacral spondylosis without myelopathy    . As per Information Obtained from the PADT (Patient Assessment and Documentation Tool)    She complains of pain in the  right posterior leg and right hand. She rates the pain 4/10 and describes it as aching. Current treatment regimen has helped relieve about 10% of the pain. She denies any side effects from the current pain regimen. Patient reports that since the last follow up visit the physical functioning is worse, family/social relationships are unchanged, mood is worse sleep patterns are worse, and that the overall functioning is worse. Patient denies misusing/abusing her narcotic pain medications or using any illegal drugs. Upon obtaining medical history from Ms. Schwab    ALLERGIES: Patients list of allergies were reviewed     MEDICATIONS: Ms. Konrad Saldivar list of medications were reviewed. Her current medications are   Outpatient Medications Prior to Visit   Medication Sig Dispense Refill    meloxicam (MOBIC) 15 MG tablet TAKE ONE TABLET BY MOUTH DAILY AS NEEDED 30 tablet 2    diazePAM (VALIUM) 5 MG tablet TAKE ONE TABLET BY MOUTH ONCE NIGHTLY AS NEEDED FOR ANXIETY OR SLEEP 30 tablet 2    albuterol sulfate HFA (PROAIR HFA) 108 (90 Base) MCG/ACT inhaler INHALE TWO PUFFS BY MOUTH FOUR TIMES A DAY AS NEEDED FOR WHEEZING 8.5 g 2    metoprolol succinate (TOPROL XL) 100 MG extended release tablet TAKE ONE TABLET BY MOUTH DAILY 90 tablet 1    amLODIPine-benazepril (LOTREL) 5-20 MG per capsule TAKE ONE CAPSULE BY MOUTH DAILY 30 capsule 3    vitamin D (ERGOCALCIFEROL) 1.25 MG (27402 UT) CAPS capsule 1 tab po q wk 4 capsule 5    tiotropium (SPIRIVA HANDIHALER) 18 MCG inhalation capsule INHALE THE ENTIRE CONTENTS OF 1 CAPSULE ONCE A DAY USING HANDIHALER DEVICE 90 capsule 2    albuterol sulfate HFA (PROAIR HFA) 108 (90 Base) MCG/ACT inhaler INHALE TWO PUFFS BY MOUTH FOUR TIMES A DAY AS NEEDED FOR WHEEZING 8.5 g 2    pregabalin (LYRICA) 150 MG capsule       furosemide (LASIX) 20 MG tablet Take 1 tablet by mouth daily 30 tablet 3     No facility-administered medications prior to visit. SOCIAL/FAMILY/PAST MEDICAL HISTORY: Ms. Ian Luevano, family and past medical history was reviewed. REVIEW OF SYSTEMS:    Respiratory: Negative for apnea, chest tightness and shortness of breath or change in baseline breathing. Gastrointestinal: Negative for nausea, vomiting, abdominal pain, diarrhea, constipation, blood in stool and abdominal distention. PHYSICAL EXAM:   Nursing note and vitals reviewed. LMP 04/04/2010   Constitutional: She appears well-developed and well-nourished. No acute distress. Skin: Skin is warm and dry, good turgor. No rash noted. She is not diaphoretic. Cardiovascular: Normal rate, regular rhythm, normal heart sounds, and does not have murmur. Pulmonary/Chest: Effort normal. No respiratory distress. She does not have wheezes in the lung fields. She has no rales. Neurological/Psychiatric:She is alert and oriented to person, place, and time. Coordination is  normal.  Her mood isAppropriate and affect is Neutral/Euthymic(normal) .      Prescription pain medication monitoring:                  MEDD current = 45              ORT Score = 0              Other Risk factors - UDS sample inconsistent with human urine              Date of Last Medication Agreement: 08/30/2022              Date Naloxone prescribed:NA-non opiate treatment plan              UDT:                          Date of last UDT: 08/30/2022                          Adverse report: YES               [place]: Checked today: Yes                          Adverse report: No    IMPRESSION:   1. Encounter for therapeutic drug monitoring    2. Chronic pain syndrome    3. Failed back surgical syndrome    4. Encounter for long-term opiate analgesic use    5. Lumbar radiculopathy    6. Lumbosacral spondylosis without myelopathy        PLAN:  Informed verbal consent was obtained:  -Non opiate treatment plan, UDS inconsistent   -will RF lyrica  -List of pain docs given to patient today   -F/U as needed for lyrica    Analgesic Plan:              Continue present regimen:no              Adjust dose of present analgesic:no              Switch analgesics:no              Add/Adjust concomitant therapy:no      Current Outpatient Medications   Medication Sig Dispense Refill    meloxicam (MOBIC) 15 MG tablet TAKE ONE TABLET BY MOUTH DAILY AS NEEDED 30 tablet 2    diazePAM (VALIUM) 5 MG tablet TAKE ONE TABLET BY MOUTH ONCE NIGHTLY AS NEEDED FOR ANXIETY OR SLEEP 30 tablet 2    albuterol sulfate HFA (PROAIR HFA) 108 (90 Base) MCG/ACT inhaler INHALE TWO PUFFS BY MOUTH FOUR TIMES A DAY AS NEEDED FOR WHEEZING 8.5 g 2    metoprolol succinate (TOPROL XL) 100 MG extended release tablet TAKE ONE TABLET BY MOUTH DAILY 90 tablet 1    amLODIPine-benazepril (LOTREL) 5-20 MG per capsule TAKE ONE CAPSULE BY MOUTH DAILY 30 capsule 3    vitamin D (ERGOCALCIFEROL) 1.25 MG (64277 UT) CAPS capsule 1 tab po q wk 4 capsule 5    tiotropium (SPIRIVA HANDIHALER) 18 MCG inhalation capsule INHALE THE ENTIRE CONTENTS OF 1 CAPSULE ONCE A DAY USING HANDIHALER DEVICE 90 capsule 2    albuterol sulfate HFA (PROAIR HFA) 108 (90 Base) MCG/ACT inhaler INHALE TWO PUFFS BY MOUTH FOUR TIMES A DAY AS NEEDED FOR WHEEZING 8.5 g 2    pregabalin (LYRICA) 150 MG capsule       furosemide (LASIX) 20 MG tablet Take 1 tablet by mouth daily 30 tablet 3     No current facility-administered medications for this visit.      I will continue her current medication regimen  which is part of the above treatment schedule. It has been helping with Ms. Schwab's chronic  medical problems which for this visit include:   Diagnoses of Encounter for therapeutic drug monitoring, Chronic pain syndrome, Failed back surgical syndrome, Encounter for long-term opiate analgesic use, Lumbar radiculopathy, and Lumbosacral spondylosis without myelopathy were pertinent to this visit. Risks and benefits of the medications and other alternative treatments  including no treatment were discussed with the patient. The common side effects of these medications were also explained to the patient. Informed verbal consent was obtained. Goals of current treatment regimen include improvement in pain, restoration of functioning- with focus on improvement in physical performance, general activity, work or disability,emotional distress, health care utilization and  decreased medication consumption. Will continue to monitor progress towards achieving/maintaining therapeutic goals with special emphasis on  1. Improvement in perceived interfernce  of pain with ADL's. Ability to do home exercises independently. Ability to do household chores indoor and/or outdoor work and social and leisure activities. Improve psychosocial and physical functioning. NOT PROGRESSING      She was advised against drinking alcohol with the narcotic pain medicines, advised against driving or handling machinery while adjusting the dose of medicines or if having cognitive  issues related to the current medications. Risk of overdose and death, if medicines not taken as prescribed, were also discussed. If the patient develops new symptoms or if the symptoms worsen, the patient should call the office. While transcribing every attempt was made to maintain the accuracy of the note in terms of it's contents,there may have been some errors made inadvertently. Thank you for allowing me to participate in the care of this patient.     MK Hamilton    Cc: Jass Almonte MD

## 2022-10-17 ENCOUNTER — APPOINTMENT (OUTPATIENT)
Dept: GENERAL RADIOLOGY | Age: 62
DRG: 469 | End: 2022-10-17
Payer: MEDICAID

## 2022-10-17 ENCOUNTER — HOSPITAL ENCOUNTER (INPATIENT)
Age: 62
LOS: 1 days | Discharge: HOME | DRG: 469 | End: 2022-10-18
Attending: EMERGENCY MEDICINE | Admitting: SPECIALIST
Payer: MEDICAID

## 2022-10-17 ENCOUNTER — APPOINTMENT (OUTPATIENT)
Dept: CT IMAGING | Age: 62
DRG: 469 | End: 2022-10-17
Payer: MEDICAID

## 2022-10-17 DIAGNOSIS — J96.01 ACUTE RESPIRATORY FAILURE WITH HYPOXIA AND HYPERCAPNIA (HCC): Primary | ICD-10-CM

## 2022-10-17 DIAGNOSIS — J96.02 ACUTE RESPIRATORY FAILURE WITH HYPOXIA AND HYPERCAPNIA (HCC): Primary | ICD-10-CM

## 2022-10-17 DIAGNOSIS — N17.9 AKI (ACUTE KIDNEY INJURY) (HCC): ICD-10-CM

## 2022-10-17 DIAGNOSIS — R77.8 ELEVATED TROPONIN: ICD-10-CM

## 2022-10-17 LAB
A/G RATIO: 1.5 (ref 1.1–2.2)
ALBUMIN SERPL-MCNC: 4 G/DL (ref 3.4–5)
ALP BLD-CCNC: 80 U/L (ref 40–129)
ALT SERPL-CCNC: 14 U/L (ref 10–40)
AMMONIA: 39 UMOL/L (ref 11–51)
AMPHETAMINE SCREEN, URINE: ABNORMAL
ANION GAP SERPL CALCULATED.3IONS-SCNC: 9 MMOL/L (ref 3–16)
AST SERPL-CCNC: 22 U/L (ref 15–37)
BARBITURATE SCREEN URINE: ABNORMAL
BASE EXCESS ARTERIAL: 0.9 MMOL/L (ref -3–3)
BASE EXCESS VENOUS: 1 MMOL/L
BASOPHILS ABSOLUTE: 0.1 K/UL (ref 0–0.2)
BASOPHILS RELATIVE PERCENT: 0.7 %
BENZODIAZEPINE SCREEN, URINE: ABNORMAL
BILIRUB SERPL-MCNC: 0.3 MG/DL (ref 0–1)
BILIRUBIN URINE: NEGATIVE
BLOOD, URINE: NEGATIVE
BUN BLDV-MCNC: 31 MG/DL (ref 7–20)
CALCIUM SERPL-MCNC: 9.6 MG/DL (ref 8.3–10.6)
CANNABINOID SCREEN URINE: ABNORMAL
CARBOXYHEMOGLOBIN ARTERIAL: 1.8 % (ref 0–1.5)
CARBOXYHEMOGLOBIN: 3.5 %
CHLORIDE BLD-SCNC: 95 MMOL/L (ref 99–110)
CLARITY: CLEAR
CO2: 25 MMOL/L (ref 21–32)
COCAINE METABOLITE SCREEN URINE: ABNORMAL
COLOR: YELLOW
CREAT SERPL-MCNC: 1.7 MG/DL (ref 0.6–1.2)
EKG ATRIAL RATE: 64 BPM
EKG DIAGNOSIS: NORMAL
EKG P AXIS: 74 DEGREES
EKG P-R INTERVAL: 186 MS
EKG Q-T INTERVAL: 378 MS
EKG QRS DURATION: 72 MS
EKG QTC CALCULATION (BAZETT): 389 MS
EKG R AXIS: 6 DEGREES
EKG T AXIS: 60 DEGREES
EKG VENTRICULAR RATE: 64 BPM
EOSINOPHILS ABSOLUTE: 0.4 K/UL (ref 0–0.6)
EOSINOPHILS RELATIVE PERCENT: 4.2 %
ETHANOL: NORMAL MG/DL (ref 0–0.08)
FENTANYL SCREEN, URINE: ABNORMAL
GFR AFRICAN AMERICAN: 37
GFR NON-AFRICAN AMERICAN: 31
GLUCOSE BLD-MCNC: 113 MG/DL (ref 70–99)
GLUCOSE BLD-MCNC: 181 MG/DL (ref 70–99)
GLUCOSE URINE: NEGATIVE MG/DL
HCO3 ARTERIAL: 26.6 MMOL/L (ref 21–29)
HCO3 VENOUS: 30 MMOL/L (ref 23–29)
HCT VFR BLD CALC: 43.9 % (ref 36–48)
HEMOGLOBIN, ART, EXTENDED: 13.1 G/DL (ref 12–16)
HEMOGLOBIN: 14.8 G/DL (ref 12–16)
KETONES, URINE: NEGATIVE MG/DL
LACTIC ACID: 1 MMOL/L (ref 0.4–2)
LEUKOCYTE ESTERASE, URINE: NEGATIVE
LIPASE: 21 U/L (ref 13–60)
LYMPHOCYTES ABSOLUTE: 1.7 K/UL (ref 1–5.1)
LYMPHOCYTES RELATIVE PERCENT: 20.4 %
Lab: ABNORMAL
MCH RBC QN AUTO: 35.3 PG (ref 26–34)
MCHC RBC AUTO-ENTMCNC: 33.7 G/DL (ref 31–36)
MCV RBC AUTO: 104.6 FL (ref 80–100)
METHADONE SCREEN, URINE: ABNORMAL
METHEMOGLOBIN ARTERIAL: 0.5 %
METHEMOGLOBIN VENOUS: 0.7 %
MICROSCOPIC EXAMINATION: NORMAL
MONOCYTES ABSOLUTE: 0.6 K/UL (ref 0–1.3)
MONOCYTES RELATIVE PERCENT: 6.7 %
NEUTROPHILS ABSOLUTE: 5.7 K/UL (ref 1.7–7.7)
NEUTROPHILS RELATIVE PERCENT: 68 %
NITRITE, URINE: NEGATIVE
O2 SAT, ARTERIAL: 88.1 %
O2 SAT, VEN: 61 %
O2 THERAPY: ABNORMAL
O2 THERAPY: ABNORMAL
OPIATE SCREEN URINE: ABNORMAL
OXYCODONE URINE: POSITIVE
PCO2 ARTERIAL: 45.6 MMHG (ref 35–45)
PCO2, VEN: 69.3 MMHG (ref 40–50)
PDW BLD-RTO: 14 % (ref 12.4–15.4)
PERFORMED ON: ABNORMAL
PH ARTERIAL: 7.37 (ref 7.35–7.45)
PH UA: 6.5 (ref 5–8)
PH UA: 7
PH VENOUS: 7.25 (ref 7.35–7.45)
PHENCYCLIDINE SCREEN URINE: ABNORMAL
PLATELET # BLD: 246 K/UL (ref 135–450)
PMV BLD AUTO: 7.6 FL (ref 5–10.5)
PO2 ARTERIAL: 52.4 MMHG (ref 75–108)
PO2, VEN: 35 MMHG
POTASSIUM REFLEX MAGNESIUM: 4.6 MMOL/L (ref 3.5–5.1)
PRO-BNP: 257 PG/ML (ref 0–124)
PROTEIN UA: NEGATIVE MG/DL
RBC # BLD: 4.2 M/UL (ref 4–5.2)
SARS-COV-2, NAAT: NOT DETECTED
SODIUM BLD-SCNC: 129 MMOL/L (ref 136–145)
SPECIFIC GRAVITY UA: 1.03 (ref 1–1.03)
TCO2 ARTERIAL: 28 MMOL/L
TCO2 CALC VENOUS: 33 MMOL/L
TOTAL CK: 954 U/L (ref 26–192)
TOTAL PROTEIN: 6.7 G/DL (ref 6.4–8.2)
TROPONIN: 0.02 NG/ML
TROPONIN: <0.01 NG/ML
URINE REFLEX TO CULTURE: NORMAL
URINE TYPE: NORMAL
UROBILINOGEN, URINE: 0.2 E.U./DL
WBC # BLD: 8.4 K/UL (ref 4–11)

## 2022-10-17 PROCEDURE — 80307 DRUG TEST PRSMV CHEM ANLYZR: CPT

## 2022-10-17 PROCEDURE — 85025 COMPLETE CBC W/AUTO DIFF WBC: CPT

## 2022-10-17 PROCEDURE — 94761 N-INVAS EAR/PLS OXIMETRY MLT: CPT

## 2022-10-17 PROCEDURE — 93010 ELECTROCARDIOGRAM REPORT: CPT | Performed by: INTERNAL MEDICINE

## 2022-10-17 PROCEDURE — 36415 COLL VENOUS BLD VENIPUNCTURE: CPT

## 2022-10-17 PROCEDURE — 99222 1ST HOSP IP/OBS MODERATE 55: CPT | Performed by: INTERNAL MEDICINE

## 2022-10-17 PROCEDURE — 6370000000 HC RX 637 (ALT 250 FOR IP): Performed by: INTERNAL MEDICINE

## 2022-10-17 PROCEDURE — 1200000000 HC SEMI PRIVATE

## 2022-10-17 PROCEDURE — 2700000000 HC OXYGEN THERAPY PER DAY

## 2022-10-17 PROCEDURE — 2580000003 HC RX 258: Performed by: EMERGENCY MEDICINE

## 2022-10-17 PROCEDURE — 99285 EMERGENCY DEPT VISIT HI MDM: CPT

## 2022-10-17 PROCEDURE — 94760 N-INVAS EAR/PLS OXIMETRY 1: CPT

## 2022-10-17 PROCEDURE — 80053 COMPREHEN METABOLIC PANEL: CPT

## 2022-10-17 PROCEDURE — 36600 WITHDRAWAL OF ARTERIAL BLOOD: CPT

## 2022-10-17 PROCEDURE — 94664 DEMO&/EVAL PT USE INHALER: CPT

## 2022-10-17 PROCEDURE — 6370000000 HC RX 637 (ALT 250 FOR IP): Performed by: EMERGENCY MEDICINE

## 2022-10-17 PROCEDURE — 94640 AIRWAY INHALATION TREATMENT: CPT

## 2022-10-17 PROCEDURE — 2580000003 HC RX 258: Performed by: INTERNAL MEDICINE

## 2022-10-17 PROCEDURE — 82077 ASSAY SPEC XCP UR&BREATH IA: CPT

## 2022-10-17 PROCEDURE — 83690 ASSAY OF LIPASE: CPT

## 2022-10-17 PROCEDURE — 83605 ASSAY OF LACTIC ACID: CPT

## 2022-10-17 PROCEDURE — 84484 ASSAY OF TROPONIN QUANT: CPT

## 2022-10-17 PROCEDURE — 71260 CT THORAX DX C+: CPT | Performed by: INTERNAL MEDICINE

## 2022-10-17 PROCEDURE — 83880 ASSAY OF NATRIURETIC PEPTIDE: CPT

## 2022-10-17 PROCEDURE — 87635 SARS-COV-2 COVID-19 AMP PRB: CPT

## 2022-10-17 PROCEDURE — 6360000002 HC RX W HCPCS: Performed by: INTERNAL MEDICINE

## 2022-10-17 PROCEDURE — 2580000003 HC RX 258: Performed by: SPECIALIST

## 2022-10-17 PROCEDURE — 82803 BLOOD GASES ANY COMBINATION: CPT

## 2022-10-17 PROCEDURE — 81003 URINALYSIS AUTO W/O SCOPE: CPT

## 2022-10-17 PROCEDURE — 82550 ASSAY OF CK (CPK): CPT

## 2022-10-17 PROCEDURE — 71046 X-RAY EXAM CHEST 2 VIEWS: CPT

## 2022-10-17 PROCEDURE — 6360000004 HC RX CONTRAST MEDICATION: Performed by: INTERNAL MEDICINE

## 2022-10-17 PROCEDURE — 82140 ASSAY OF AMMONIA: CPT

## 2022-10-17 PROCEDURE — 93005 ELECTROCARDIOGRAM TRACING: CPT | Performed by: EMERGENCY MEDICINE

## 2022-10-17 RX ORDER — ASPIRIN 325 MG
325 TABLET ORAL ONCE
Status: COMPLETED | OUTPATIENT
Start: 2022-10-17 | End: 2022-10-17

## 2022-10-17 RX ORDER — 0.9 % SODIUM CHLORIDE 0.9 %
500 INTRAVENOUS SOLUTION INTRAVENOUS ONCE
Status: COMPLETED | OUTPATIENT
Start: 2022-10-17 | End: 2022-10-17

## 2022-10-17 RX ORDER — PREDNISONE 20 MG/1
60 TABLET ORAL ONCE
Status: COMPLETED | OUTPATIENT
Start: 2022-10-17 | End: 2022-10-17

## 2022-10-17 RX ORDER — SODIUM CHLORIDE 450 MG/100ML
INJECTION, SOLUTION INTRAVENOUS CONTINUOUS
Status: DISCONTINUED | OUTPATIENT
Start: 2022-10-17 | End: 2022-10-17

## 2022-10-17 RX ORDER — LEVOFLOXACIN 5 MG/ML
250 INJECTION, SOLUTION INTRAVENOUS EVERY 24 HOURS
Status: DISCONTINUED | OUTPATIENT
Start: 2022-10-17 | End: 2022-10-18 | Stop reason: HOSPADM

## 2022-10-17 RX ORDER — IPRATROPIUM BROMIDE AND ALBUTEROL SULFATE 2.5; .5 MG/3ML; MG/3ML
1 SOLUTION RESPIRATORY (INHALATION) ONCE
Status: COMPLETED | OUTPATIENT
Start: 2022-10-17 | End: 2022-10-17

## 2022-10-17 RX ORDER — PREGABALIN 100 MG/1
100 CAPSULE ORAL 3 TIMES DAILY
Status: DISCONTINUED | OUTPATIENT
Start: 2022-10-17 | End: 2022-10-18 | Stop reason: HOSPADM

## 2022-10-17 RX ORDER — FAMOTIDINE 20 MG/1
20 TABLET, FILM COATED ORAL DAILY
Status: DISCONTINUED | OUTPATIENT
Start: 2022-10-17 | End: 2022-10-18 | Stop reason: HOSPADM

## 2022-10-17 RX ORDER — PREGABALIN 150 MG/1
300 CAPSULE ORAL NIGHTLY
COMMUNITY

## 2022-10-17 RX ORDER — IPRATROPIUM BROMIDE AND ALBUTEROL SULFATE 2.5; .5 MG/3ML; MG/3ML
1 SOLUTION RESPIRATORY (INHALATION)
Status: DISCONTINUED | OUTPATIENT
Start: 2022-10-17 | End: 2022-10-18 | Stop reason: HOSPADM

## 2022-10-17 RX ORDER — SODIUM CHLORIDE 9 MG/ML
INJECTION, SOLUTION INTRAVENOUS CONTINUOUS
Status: DISCONTINUED | OUTPATIENT
Start: 2022-10-17 | End: 2022-10-18 | Stop reason: HOSPADM

## 2022-10-17 RX ORDER — ENOXAPARIN SODIUM 100 MG/ML
40 INJECTION SUBCUTANEOUS EVERY EVENING
Status: DISCONTINUED | OUTPATIENT
Start: 2022-10-17 | End: 2022-10-18 | Stop reason: HOSPADM

## 2022-10-17 RX ADMIN — IPRATROPIUM BROMIDE AND ALBUTEROL SULFATE 1 AMPULE: .5; 3 SOLUTION RESPIRATORY (INHALATION) at 21:54

## 2022-10-17 RX ADMIN — PREDNISONE 60 MG: 20 TABLET ORAL at 03:44

## 2022-10-17 RX ADMIN — MOMETASONE FUROATE AND FORMOTEROL FUMARATE DIHYDRATE 2 PUFF: 200; 5 AEROSOL RESPIRATORY (INHALATION) at 21:54

## 2022-10-17 RX ADMIN — SODIUM CHLORIDE: 9 INJECTION, SOLUTION INTRAVENOUS at 14:46

## 2022-10-17 RX ADMIN — PREGABALIN 100 MG: 100 CAPSULE ORAL at 14:45

## 2022-10-17 RX ADMIN — FAMOTIDINE 20 MG: 20 TABLET, FILM COATED ORAL at 14:44

## 2022-10-17 RX ADMIN — SODIUM CHLORIDE 500 ML: 9 INJECTION, SOLUTION INTRAVENOUS at 03:45

## 2022-10-17 RX ADMIN — IPRATROPIUM BROMIDE AND ALBUTEROL SULFATE 1 AMPULE: .5; 3 SOLUTION RESPIRATORY (INHALATION) at 18:08

## 2022-10-17 RX ADMIN — IPRATROPIUM BROMIDE AND ALBUTEROL SULFATE 1 AMPULE: .5; 3 SOLUTION RESPIRATORY (INHALATION) at 03:09

## 2022-10-17 RX ADMIN — SODIUM CHLORIDE: 4.5 INJECTION, SOLUTION INTRAVENOUS at 09:31

## 2022-10-17 RX ADMIN — IOPAMIDOL 75 ML: 755 INJECTION, SOLUTION INTRAVENOUS at 15:17

## 2022-10-17 RX ADMIN — ENOXAPARIN SODIUM 40 MG: 100 INJECTION SUBCUTANEOUS at 17:09

## 2022-10-17 RX ADMIN — LEVOFLOXACIN 250 MG: 5 INJECTION, SOLUTION INTRAVENOUS at 17:29

## 2022-10-17 RX ADMIN — ASPIRIN 325 MG: 325 TABLET ORAL at 03:44

## 2022-10-17 RX ADMIN — PREGABALIN 100 MG: 100 CAPSULE ORAL at 20:28

## 2022-10-17 ASSESSMENT — ENCOUNTER SYMPTOMS
FACIAL SWELLING: 0
COUGH: 0
COLOR CHANGE: 0
TROUBLE SWALLOWING: 0
PHOTOPHOBIA: 0
BACK PAIN: 1
VOMITING: 0
SHORTNESS OF BREATH: 0
ABDOMINAL PAIN: 0

## 2022-10-17 ASSESSMENT — PAIN SCALES - GENERAL: PAINLEVEL_OUTOF10: 0

## 2022-10-17 NOTE — PLAN OF CARE
Problem: Discharge Planning  Goal: Discharge to home or other facility with appropriate resources  Outcome: Progressing  Flowsheets (Taken 10/17/2022 0809)  Discharge to home or other facility with appropriate resources:   Identify barriers to discharge with patient and caregiver   Arrange for needed discharge resources and transportation as appropriate

## 2022-10-17 NOTE — PROGRESS NOTES
Pt admitted to floor at 0625pt admitted for Copd exacerbations and acute kidney injury. She came in with complaint of tremors per transferring nurse. PT 0xygen was 84 on room air upon arrival, Pt is now on 4l per Nc with Sp02 level at 91%. Pt states she uses electric cigarettes and has smoked for 45 years. Patient is alert and oriented x4,  able to make ll needs known. Denies pain.

## 2022-10-17 NOTE — CARE COORDINATION
INITIAL CASE MANAGEMENT ASSESSMENT    Reviewed chart, met with patient to assess possible discharge needs. Explained Case Management role/services. Living Situation: Patient lives with her domesitc partner in a two story home; laundry in basement    ADLs: manages on her own; does laundry 1 time/week     DME: none--feels she may need a nebulizer    PT/OT Recs: n/a     Active Services: none     Transportation: active      Medications: takes on her own; Fabienne Manuel    PCP: Brittany Ernst MD    PLAN/COMMENTS: Patient is unsure of her discharge needs. She reported she is not on O2 at home and does not want to be but feels she could benefit from a nebulizer. Patient plans to return home at discharge. SW/CM will follow and assist as needed.     Electronically signed by DONAVAN Renae, JAIME, Case Management on 10/17/2022 at 12:01 PM  [facility]

## 2022-10-17 NOTE — PROGRESS NOTES
[facility]  Respiratory Therapy  Patient Education      Name:  Christiano Tesfaye  Medical Record Number:  [id number]  Age: 63 y.o.   : 1960  Today's Date:  10/17/2022  Room:  [address] [place] [address]         Assessment      BP (!) 102/52   Pulse 61   Temp 99.1 °F (37.3 °C) (Temporal)   Resp 20   Ht 5' 5\" (1.651 m)   Wt 169 lb 15.6 oz (77.1 kg)   LMP 2010   SpO2 91%   BMI 28.29 kg/m²     Patient Active Problem List   Diagnosis    Failed back surgical syndrome    Encounter for therapeutic drug monitoring    Lumbar radiculopathy    Chronic pain syndrome    Encounter for long-term opiate analgesic use    Lumbosacral spondylosis without myelopathy       Social History  Social History     Tobacco Use    Smoking status: Every Day     Packs/day: 0.50     Years: 35.00     Pack years: 17.50     Types: Cigarettes    Smokeless tobacco: Never   Vaping Use    Vaping Use: Every day   Substance Use Topics    Alcohol use: Yes     Comment: occ    Drug use: No       Modality:     HHN      Education       Patient/caregiver was educated on the proper method of use:  Yes        Level of patient/caregiver understanding able to:  Verbalize understanding and Demonstrate understanding     Education status:   Provided instructions on medications, Provided instructions on technique and indications, and Provided instructions on adverse reactions      Response to education:    Good     Teaching Time: 5  minutes         Electronically signed by Deena Wilkinson RCP on 10/17/2022 at 3:13 AM

## 2022-10-17 NOTE — ED TRIAGE NOTES
Miguel Huitron is a 63 y.o. female brought herself to the [facility] for tremors that have been coming the last couple of days. The patients has an open and patent airway but her SpO2 is 84% RA, the patient states that she has COPD and asthma and takes her inhalers but smokes and vapes. The patient does not currently wear oxygen at home. The patient was placed on a NC at 4lpm O2 and her SpO2 came up to 90%. The patient is alert and oriented.

## 2022-10-17 NOTE — PROGRESS NOTES
4 Eyes Admission Assessment     I agree as the admission nurse that 2 RN's have performed a thorough Head to Toe Skin Assessment on the patient. ALL assessment sites listed below have been assessed on admission. Areas assessed by both nurses:   []   Head, Face, and Ears   []   Shoulders, Back, and Chest  []   Arms, Elbows, and Hands   []   Coccyx, Sacrum, and Ischum  []   Legs, Feet, and Heels        Does the Patient have Skin Breakdown?   No         Reginald Prevention initiated:  No   Wound Care Orders initiated:  No      [facility] nurse consulted for Pressure Injury (Stage 3,4, Unstageable, DTI, NWPT, and Complex wounds):  No      Nurse 1 eSignature: Electronically signed by Catrachita Hunt RN on 10/17/22 at 2:46 PM EDT    **SHARE this note so that the co-signing nurse is able to place an eSignature**    Nurse 2 eSignature: Electronically signed by Jelani Wooten RN on 10/17/22 at 4:42 PM EDT

## 2022-10-17 NOTE — PROGRESS NOTES
Pt AAO x4. No c/o pain. Assessment completed. IVF infusing without any difficulty. VSS. Lungs diminished in bases. No c/o cough or SOB. Denies any needs. Call light in reach. Will monitor.

## 2022-10-17 NOTE — ED PROVIDER NOTES
[facility]  EMERGENCY DEPARTMENTENCOUNTER      Pt Name: Jerson Lee  MRN: [id number]  [place] 1960  Date ofevaluation: 10/17/2022  Provider: Junior Scot MD    CHIEF COMPLAINT       Chief Complaint   Patient presents with    Tremors     Patient has had tremors the last couple of days          HISTORY OF PRESENT ILLNESS   (Location/Symptom, Timing/Onset,Context/Setting, Quality, Duration, Modifying Factors, Severity)  Note limiting factors. Jerson Lee is a 63 y.o. female  who  has a past medical history of Hypertension and Wears glasses. who presents to the emergency department ration of tremors particularly upper extremities which have been progressively worsening over the past 3 days. Patient reports gradual onset of symptoms. She denies focal weakness numbness headache changes in vision nausea or vomiting. Denies a change in medications. Reports she does occasional alcohol but denies any recent cessation or overindulgence. Patient is prescribed Valium which she states she does take occasionally. Denies fevers or rash. She has or had a reported history of chronic back problems. Denies a history of tremors. She states she has been dropping objects. On presentation patient noted to be hypoxic. Denies a history of home oxygen use. Reports that she was not feeling particularly dyspneic. HPI    NursingNotes were reviewed. REVIEW OF SYSTEMS    (2-9 systems for level 4, 10 or more for level 5)     Review of Systems   Constitutional:  Negative for activity change, fatigue and fever. HENT:  Negative for congestion, ear pain, facial swelling, mouth sores and trouble swallowing. Eyes:  Negative for photophobia and visual disturbance. Respiratory:  Negative for cough and shortness of breath. Cardiovascular:  Negative for chest pain and palpitations. Gastrointestinal:  Negative for abdominal pain and vomiting.    Genitourinary:  Negative for difficulty urinating and frequency. Musculoskeletal:  Positive for back pain. Negative for gait problem and neck pain. Skin:  Negative for color change, rash and wound. Neurological:  Positive for tremors. Negative for dizziness, seizures, speech difficulty, weakness, light-headedness, numbness and headaches. Psychiatric/Behavioral:  Negative for confusion. The patient is not nervous/anxious. Except as noted above the remainder of the review of systems was reviewed and negative. PAST MEDICAL HISTORY     Past Medical History:   Diagnosis Date    Hypertension     Wears glasses     reading         SURGICALHISTORY       Past Surgical History:   Procedure Laterality Date    BACK SURGERY  1998    ruptured disc    CHOLECYSTECTOMY, LAPAROSCOPIC  4/14/14         CURRENT MEDICATIONS       Previous Medications    ALBUTEROL SULFATE HFA (PROAIR HFA) 108 (90 BASE) MCG/ACT INHALER    INHALE TWO PUFFS BY MOUTH FOUR TIMES A DAY AS NEEDED FOR WHEEZING    ALBUTEROL SULFATE HFA (PROAIR HFA) 108 (90 BASE) MCG/ACT INHALER    INHALE TWO PUFFS BY MOUTH FOUR TIMES A DAY AS NEEDED FOR WHEEZING    AMLODIPINE-BENAZEPRIL (LOTREL) 5-20 MG PER CAPSULE    TAKE ONE CAPSULE BY MOUTH DAILY    DIAZEPAM (VALIUM) 5 MG TABLET    TAKE ONE TABLET BY MOUTH ONCE NIGHTLY AS NEEDED FOR ANXIETY OR SLEEP    FUROSEMIDE (LASIX) 20 MG TABLET    Take 1 tablet by mouth daily    MELOXICAM (MOBIC) 15 MG TABLET    TAKE ONE TABLET BY MOUTH DAILY AS NEEDED    METOPROLOL SUCCINATE (TOPROL XL) 100 MG EXTENDED RELEASE TABLET    TAKE ONE TABLET BY MOUTH DAILY    PREGABALIN (LYRICA) 150 MG CAPSULE        TIOTROPIUM (SPIRIVA HANDIHALER) 18 MCG INHALATION CAPSULE    INHALE THE ENTIRE CONTENTS OF 1 CAPSULE ONCE A DAY USING HANDIHALER DEVICE    VITAMIN D (ERGOCALCIFEROL) 1.25 MG (95381 UT) CAPS CAPSULE    1 tab po q wk            Patient has no known allergies.     FAMILY HISTORY       Family History   Problem Relation Age of Onset    Cancer Mother         colon Early Death Father         killed in a fire          SOCIAL HISTORY       Social History     Socioeconomic History    Marital status: Single     Spouse name: None    Number of children: None    Years of education: None    Highest education level: None   Tobacco Use    Smoking status: Every Day     Packs/day: 0.50     Years: 35.00     Pack years: 17.50     Types: Cigarettes    Smokeless tobacco: Never   Vaping Use    Vaping Use: Every day   Substance and Sexual Activity    Alcohol use: Yes     Comment: occ    Drug use: No    Sexual activity: Yes     Partners: Male       SCREENINGS    Sathish Coma Scale  Eye Opening: Spontaneous  Best Verbal Response: Oriented  Best Motor Response: Obeys commands  Sathish Coma Scale Score: 15        PHYSICAL EXAM    (up to 7 for level 4, 8 or more for level 5)     ED Triage Vitals [10/17/22 0044]   BP Temp Temp Source Heart Rate Resp SpO2 Height Weight   (!) 116/55 99.1 °F (37.3 °C) Temporal 80 20 (!) 84 % 5' 5\" (1.651 m) 169 lb 15.6 oz (77.1 kg)       Physical Exam  Vitals reviewed. Constitutional:       Appearance: She is well-developed. HENT:      Head: Normocephalic and atraumatic. Mouth/Throat:      Mouth: Mucous membranes are moist.   Eyes:      Extraocular Movements: Extraocular movements intact. Conjunctiva/sclera: Conjunctivae normal.      Pupils: Pupils are equal, round, and reactive to light. Neck:      Trachea: No tracheal deviation. Cardiovascular:      Rate and Rhythm: Normal rate and regular rhythm. Heart sounds: Normal heart sounds. Pulmonary:      Effort: Pulmonary effort is normal.      Breath sounds: Normal breath sounds. Abdominal:      General: There is no distension. Palpations: Abdomen is soft. Tenderness: There is no abdominal tenderness. Musculoskeletal:         General: Normal range of motion. Cervical back: Normal range of motion. Skin:     General: Skin is warm and dry.       Capillary Refill: Capillary refill takes less than 2 seconds. Neurological:      General: No focal deficit present. Mental Status: She is alert and oriented to person, place, and time. Cranial Nerves: No cranial nerve deficit. Sensory: No sensory deficit. Motor: No weakness. RESULTS     EKG: All EKG's are interpreted by the Emergency Department Physician who either signs or Co-signsthis chart in the absence of a cardiologist.    EKG demonstrates a sinus rhythm ventricular rate of 64 bpm.  ID interval is prolonged and QTc interval within normal limits. Patient has a normal axis. There are no significant ST elevations or depressions EKG is nondiagnostic for ACS. Jessu Knight Compared EKG from 12/8/2020 there is decrease in ST segment amplitude in the lateral leads. RADIOLOGY:   Non-plain filmimages such as CT, Ultrasound and MRI are read by the radiologist. Plain radiographic images are visualized and preliminarily interpreted by the emergency physician with the below findings:      Interpretation per the Radiologist below, if available at the time ofthis note:    XR CHEST (2 VW)   Final Result   No radiographic evidence of an acute cardiopulmonary process. ED BEDSIDE ULTRASOUND:   Performed by ED Physician - none    LABS:  Labs Reviewed   COVID-19, RAPID   CBC WITH AUTO DIFFERENTIAL   COMPREHENSIVE METABOLIC PANEL W/ REFLEX TO MG FOR LOW K   ETHANOL   AMMONIA   LIPASE   TROPONIN   URINALYSIS WITH REFLEX TO CULTURE   LACTIC ACID   BRAIN NATRIURETIC PEPTIDE   BLOOD GAS, VENOUS       All other labs were within normal range or not returned as of this dictation.     EMERGENCY DEPARTMENT COURSE and DIFFERENTIAL DIAGNOSIS/MDM:   Vitals:    Vitals:    10/17/22 0044 10/17/22 0047   BP: (!) 116/55    Pulse: 80    Resp: 20    Temp: 99.1 °F (37.3 °C)    TempSrc: Temporal    SpO2: (!) 84% 91%   Weight: 169 lb 15.6 oz (77.1 kg)    Height: 5' 5\" (1.651 m)        Patient was given thefollowing medications:  Medications - No data to display    ED COURSE & MEDICAL DECISION MAKING    Pertinent Labs & Imaging studies reviewed. (See chart for details)   -  Patient seen and evaluated in the emergency department. -  Triage and nursing notes reviewed and incorporated. -  Old chart records reviewed and incorporated. -  Differential diagnosis includes: Differential diagnosis:  dehydration, acute renal failure, electrolyte abnormalities, infection, GI emergency, cardiac emergency, pulmonary emergency, other    -  Work-up included:  See above  -  ED treatment included: See above  -  Results discussed with patient. See ED for evaluation of tremors in the bilateral upper extremities. On exam patient does appear tremors but appears to have almost asterixis when hands or dorsiflexed. Patient also noted to be hypoxic on arrival to the ED. denies feeling dyspneic or having fevers or cough. labs show which demonstrate hypercapnia. No function elevated compared to baseline. Patient has a slightly elevated troponin likely secondary to renal dysfunction. CK is elevated. . Imaging studies show acute cardiopulmonary disease. Suspect hypoxia is likely secondary to COPD exacerbation patient given duo nebs and prednisone. She was given fluid hydration for CHARLY. She will be admitted for further medical management. .  Patient feels well on reevaluation. She remains hemodynamically stable. The patient is agreeable with plan of care and disposition. Is this patient to be included in the SEP-1 Core Measure due to severe sepsis or septic shock? No   Exclusion criteria - the patient is NOT to be included for SEP-1 Core Measure due to: Infection is not suspected      REASSESSMENT          CRITICAL CARE TIME   Total Critical Care time was 35 minutes, excluding separately reportable procedures. There was a high probability of clinically significant/life threatening deterioration in the patient's condition which required my urgent intervention. CONSULTS:  None    PROCEDURES:  Unless otherwise noted below, none     Procedures    FINAL IMPRESSION      1. Acute respiratory failure with hypoxia and hypercapnia (HCC)    2. CHARLY (acute kidney injury) ([facility] [address]    3. Elevated troponin          DISPOSITION/PLAN   DISPOSITION        PATIENT REFERREDTO:  No follow-up provider specified.     DISCHARGEMEDICATIONS:  New Prescriptions    No medications on file          (Please note that portions of this note were completed with a voice recognition program.  Efforts were made to edit the dictations but occasionally words are mis-transcribed.)    Erasmo Soulier, MD (electronically signed)  Attending Emergency Physician          Erasmo Soulier, MD  10/17/22 5005

## 2022-10-17 NOTE — PROGRESS NOTES
Pharmacy Medication Reconciliation Note     List of medications patient is currently taking is complete. Source of information:   1. Rx disp history  2. Patient interview    Notes regarding home medications:   1. Changed Furosemide to 20mg daily prn  2. Changed Meloxicam to 15mg daily  3. Changed Pregabalin to 150mg QAM & 300mg QHS  4.  Patient has not taken Oxycodone since the beginning of October (trying to come off this medication)  History of taking since 2015    Denies taking any other OTC or herbal medications    Alison Claire, [facility], [address] 10/17/2022 10:55 AM

## 2022-10-17 NOTE — PROGRESS NOTES
Patient admitted to floor. VSS. Patient alert and oriented times four. Patient denies pain at this time. Patient IV currently infusing as ordered. Patient tolerating PO intake, no n/v voiced. Patient able to ambulate around room safely. Patient able to make needs known, no needs voiced at this time. Call light and bedside table within reach. Will continue to monitor and reassess.

## 2022-10-17 NOTE — PLAN OF CARE
Problem: Discharge Planning  Goal: Discharge to home or other facility with appropriate resources  10/17/2022 1946 by Raimundo Upton RN  Outcome: Progressing  Flowsheets (Taken 10/17/2022 1925)  Discharge to home or other facility with appropriate resources: Identify barriers to discharge with patient and caregiver     Problem: Safety - Adult  Goal: Free from fall injury  10/17/2022 1946 by Raimundo Upton RN  Outcome: Progressing  Flowsheets (Taken 10/17/2022 1946)  Free From Fall Injury: Instruct family/caregiver on patient safety     Problem: Pain  Goal: Verbalizes/displays adequate comfort level or baseline comfort level  Outcome: Progressing

## 2022-10-17 NOTE — PLAN OF CARE
Problem: Discharge Planning  Goal: Discharge to home or other facility with appropriate resources  10/17/2022 0939 by Nanci Verde RN  Outcome: Progressing  Flowsheets (Taken 10/17/2022 0809)  Discharge to home or other facility with appropriate resources:   Identify barriers to discharge with patient and caregiver   Arrange for needed discharge resources and transportation as appropriate  10/17/2022 0809 by Nanci Verde RN  Outcome: Progressing  Flowsheets (Taken 10/17/2022 0809)  Discharge to home or other facility with appropriate resources:   Identify barriers to discharge with patient and caregiver   Arrange for needed discharge resources and transportation as appropriate     Problem: Safety - Adult  Goal: Free from fall injury  Outcome: Progressing  Flowsheets (Taken 10/17/2022 0939)  Free From Fall Injury: Instruct family/caregiver on patient safety

## 2022-10-17 NOTE — ACP (ADVANCE CARE PLANNING)
Advance Care Planning     Advance Care Planning Activator (Inpatient)  Conversation Note      Date of ACP Conversation: 10/17/2022     Conversation Conducted with: Patient with Decision Making Capacity    ACP Activator: [address] [place] Decision Maker:     Current Designated Health Care Decision Maker:     Primary Decision Maker: Ashley Lott Domestic Partner - [phone]    Care Preferences    Ventilation: \"If you were in your present state of health and suddenly became very ill and were unable to breathe on your own, what would your preference be about the use of a ventilator (breathing machine) if it were available to you? \"      Would the patient desire the use of ventilator (breathing machine)?: yes    \"If your health worsens and it becomes clear that your chance of recovery is unlikely, what would your preference be about the use of a ventilator (breathing machine) if it were available to you? \"     Would the patient desire the use of ventilator (breathing machine)?: No      Resuscitation  \"CPR works best to restart the heart when there is a sudden event, like a heart attack, in someone who is otherwise healthy. Unfortunately, CPR does not typically restart the heart for people who have serious health conditions or who are very sick. \"    \"In the event your heart stopped as a result of an underlying serious health condition, would you want attempts to be made to restart your heart (answer \"yes\" for attempt to resuscitate) or would you prefer a natural death (answer \"no\" for do not attempt to resuscitate)? \" yes       [] Yes   [] No   Educated Patient / Jake Venegas regarding differences between Advance Directives and portable DNR orders.     Length of ACP Conversation in minutes:      Conversation Outcomes:  [x] ACP discussion completed  [] Existing advance directive reviewed with patient; no changes to patient's previously recorded wishes  [] New Advance Directive completed  [] Portable Do Not Rescitate prepared for Provider review and signature  [] POLST/POST/MOLST/MOST prepared for Provider review and signature      Follow-up plan:    [] Schedule follow-up conversation to continue planning  [] Referred individual to Provider for additional questions/concerns   [] Advised patient/agent/surrogate to review completed ACP document and update if needed with changes in condition, patient preferences or care setting    [x] This note routed to one or more involved healthcare providers      Electronically signed by DONAVAN Frost, JAIME, Case Management on 10/17/2022 at 12:01 PM  [facility] [phone]

## 2022-10-17 NOTE — ED NOTES
Pt came to triage with oxygen saturation at 84% on room air. CHARLEY Hankins put patient on 4L nasal cannula, and oxygen improved to 91%.      Enrique Habermann, Tech  10/17/2022, 00:48

## 2022-10-18 VITALS
OXYGEN SATURATION: 93 % | WEIGHT: 169.97 LBS | SYSTOLIC BLOOD PRESSURE: 124 MMHG | DIASTOLIC BLOOD PRESSURE: 64 MMHG | HEIGHT: 65 IN | RESPIRATION RATE: 18 BRPM | BODY MASS INDEX: 28.32 KG/M2 | HEART RATE: 92 BPM | TEMPERATURE: 98.6 F

## 2022-10-18 LAB
A/G RATIO: 1.4 (ref 1.1–2.2)
ALBUMIN SERPL-MCNC: 3.3 G/DL (ref 3.4–5)
ALP BLD-CCNC: 59 U/L (ref 40–129)
ALT SERPL-CCNC: 11 U/L (ref 10–40)
ANION GAP SERPL CALCULATED.3IONS-SCNC: 10 MMOL/L (ref 3–16)
AST SERPL-CCNC: 14 U/L (ref 15–37)
BILIRUB SERPL-MCNC: <0.2 MG/DL (ref 0–1)
BUN BLDV-MCNC: 15 MG/DL (ref 7–20)
CALCIUM SERPL-MCNC: 8.9 MG/DL (ref 8.3–10.6)
CHLORIDE BLD-SCNC: 104 MMOL/L (ref 99–110)
CO2: 24 MMOL/L (ref 21–32)
CREAT SERPL-MCNC: 0.9 MG/DL (ref 0.6–1.2)
GFR SERPL CREATININE-BSD FRML MDRD: >60 ML/MIN/{1.73_M2}
GLUCOSE BLD-MCNC: 101 MG/DL (ref 70–99)
POTASSIUM SERPL-SCNC: 3.9 MMOL/L (ref 3.5–5.1)
PROCALCITONIN: 0.05 NG/ML (ref 0–0.15)
SODIUM BLD-SCNC: 138 MMOL/L (ref 136–145)
TOTAL PROTEIN: 5.6 G/DL (ref 6.4–8.2)

## 2022-10-18 PROCEDURE — 99238 HOSP IP/OBS DSCHRG MGMT 30/<: CPT | Performed by: INTERNAL MEDICINE

## 2022-10-18 PROCEDURE — 2700000000 HC OXYGEN THERAPY PER DAY

## 2022-10-18 PROCEDURE — 6370000000 HC RX 637 (ALT 250 FOR IP): Performed by: INTERNAL MEDICINE

## 2022-10-18 PROCEDURE — 80053 COMPREHEN METABOLIC PANEL: CPT

## 2022-10-18 PROCEDURE — 2580000003 HC RX 258: Performed by: INTERNAL MEDICINE

## 2022-10-18 PROCEDURE — 94760 N-INVAS EAR/PLS OXIMETRY 1: CPT

## 2022-10-18 PROCEDURE — 84145 PROCALCITONIN (PCT): CPT

## 2022-10-18 PROCEDURE — 36415 COLL VENOUS BLD VENIPUNCTURE: CPT

## 2022-10-18 PROCEDURE — 94640 AIRWAY INHALATION TREATMENT: CPT

## 2022-10-18 RX ORDER — IPRATROPIUM BROMIDE AND ALBUTEROL SULFATE 2.5; .5 MG/3ML; MG/3ML
1 SOLUTION RESPIRATORY (INHALATION) 4 TIMES DAILY
Qty: 360 ML | Refills: 2 | Status: SHIPPED | OUTPATIENT
Start: 2022-10-18

## 2022-10-18 RX ORDER — LEVOFLOXACIN 500 MG/1
500 TABLET, FILM COATED ORAL DAILY
Qty: 7 TABLET | Refills: 0 | Status: SHIPPED | OUTPATIENT
Start: 2022-10-18 | End: 2022-10-25

## 2022-10-18 RX ADMIN — IPRATROPIUM BROMIDE AND ALBUTEROL SULFATE 1 AMPULE: .5; 3 SOLUTION RESPIRATORY (INHALATION) at 11:56

## 2022-10-18 RX ADMIN — PREGABALIN 100 MG: 100 CAPSULE ORAL at 08:38

## 2022-10-18 RX ADMIN — MOMETASONE FUROATE AND FORMOTEROL FUMARATE DIHYDRATE 2 PUFF: 200; 5 AEROSOL RESPIRATORY (INHALATION) at 08:57

## 2022-10-18 RX ADMIN — IPRATROPIUM BROMIDE AND ALBUTEROL SULFATE 1 AMPULE: .5; 3 SOLUTION RESPIRATORY (INHALATION) at 08:57

## 2022-10-18 RX ADMIN — FAMOTIDINE 20 MG: 20 TABLET, FILM COATED ORAL at 08:38

## 2022-10-18 RX ADMIN — SODIUM CHLORIDE: 9 INJECTION, SOLUTION INTRAVENOUS at 01:03

## 2022-10-18 ASSESSMENT — PAIN SCALES - GENERAL: PAINLEVEL_OUTOF10: 0

## 2022-10-18 NOTE — CARE COORDINATION
CASE MANAGEMENT DISCHARGE SUMMARY:    DISCHARGE DATE: 10/18/2022    DISCHARGED TO HOME     TRANSPORTATION: family                PREFERRED PHARMACY: [facility]    DME: Nebulizer. COMMENTS: Spoke with patient regarding nebulizer order. She would like it dispensed prior to discharge. Made a referral to [address] [place] [address] liaison.     Electronically signed by DONAVAN Zamora, JAIME, Case Management on 10/18/2022 at 1:30 PM  [facility] [phone]

## 2022-10-18 NOTE — PLAN OF CARE
Problem: Discharge Planning  Goal: Discharge to home or other facility with appropriate resources  10/18/2022 0802 by Jeremy Avila RN  Outcome: Progressing  Flowsheets (Taken 10/18/2022 0802)  Discharge to home or other facility with appropriate resources:   Identify barriers to discharge with patient and caregiver   Arrange for needed discharge resources and transportation as appropriate   Identify discharge learning needs (meds, wound care, etc)     Problem: Safety - Adult  Goal: Free from fall injury  10/18/2022 0802 by Jeremy Avila RN  Outcome: Progressing  Flowsheets (Taken 10/18/2022 0802)  Free From Fall Injury: Instruct family/caregiver on patient safety     Problem: Pain  Goal: Verbalizes/displays adequate comfort level or baseline comfort level  10/18/2022 0802 by Jeremy Avila RN  Outcome: Progressing  Flowsheets (Taken 10/18/2022 0802)  Verbalizes/displays adequate comfort level or baseline comfort level:   Encourage patient to monitor pain and request assistance   Assess pain using appropriate pain scale   Administer analgesics based on type and severity of pain and evaluate response   Implement non-pharmacological measures as appropriate and evaluate response

## 2022-10-18 NOTE — PROGRESS NOTES
Data- discharge order received, pt verbalized agreement to discharge, disposition to previous residence, DME for nebulizer    Action- discharge instructions prepared and given to patient, pt verbalized understanding. Medication information packet given r/t NEW and/or CHANGED prescriptions emphasizing name/purpose/side effects, pt verbalized understanding. Discharge instruction summary: Diet- general, Activity- as tolerated, Primary Care Physician as follows: Sergei Matt [phone] f/u appointment scheduled by patient, immunizations reviewed and up to date, prescription medications filled at retail pharmacy. Response- Pt belongings gathered, IV removed. Disposition is home (HHC/DME needs), transported with belongings, taken to [facility] ambulatory w/ this RN, no complications. Son transporting home.

## 2022-10-18 NOTE — H&P
Hospital Medicine History & Physical    Patient:  Luca Mobley  YOB: 1960  Date of Service: 10/17/22  MRN: [id number]    PCP: Patrick Verduzco MD    Date of Admission: 10/17/2022      Chief Complaint:    Chief Complaint   Patient presents with    Tremors     Patient has had tremors the last couple of days          History Of Present Illness: The patient is a 63 y.o. female who presents to [facility] with c/o as above  Was hypoxic  Put on o2  Now feels better   On o2  Specific reason  Recently failed drug screen  No other compalints    Past Medical History:        Diagnosis Date    Hypertension     Wears glasses     reading       Past Surgical History:        Procedure Laterality Date    BACK SURGERY  1998    ruptured disc    CHOLECYSTECTOMY, LAPAROSCOPIC  4/14/14       Family History:  Family History   Problem Relation Age of Onset    Cancer Mother         colon    Early Death Father         killed in a fire       Medications Prior to Admission:    Prior to Admission medications    Medication Sig Start Date End Date Taking? Authorizing Provider   pregabalin (LYRICA) 150 MG capsule Take 300 mg by mouth at bedtime.    Yes Historical Provider, MD   meloxicam (MOBIC) 15 MG tablet TAKE ONE TABLET BY MOUTH DAILY AS NEEDED  Patient taking differently: Take 15 mg by mouth daily 9/19/22   Patrick Verduzco MD   diazePAM (VALIUM) 5 MG tablet TAKE ONE TABLET BY MOUTH ONCE NIGHTLY AS NEEDED FOR ANXIETY OR SLEEP 9/19/22 10/19/22  Patrick Verduzco MD   metoprolol succinate (TOPROL XL) 100 MG extended release tablet TAKE ONE TABLET BY MOUTH DAILY 9/15/22   Patrick Verduzco MD   amLODIPine-benazepril (LOTREL) 5-20 MG per capsule TAKE ONE CAPSULE BY MOUTH DAILY 9/15/22   Patrick Verduzco MD   vitamin D (ERGOCALCIFEROL) 1.25 MG (83845 UT) CAPS capsule 1 tab po q wk 9/15/22   Madeline Poon MD   tiotropium (SPIRIVA HANDIHALER) 18 MCG inhalation capsule INHALE THE ENTIRE CONTENTS OF 1 CAPSULE ONCE A DAY USING HANDIHALER DEVICE 9/15/22   Mandi Briceno MD   albuterol sulfate HFA (PROAIR HFA) 108 (90 Base) MCG/ACT inhaler INHALE TWO PUFFS BY MOUTH FOUR TIMES A DAY AS NEEDED FOR WHEEZING 9/15/22   Madeline Poon MD   pregabalin (LYRICA) 150 MG capsule Take 150 mg by mouth every morning. 5/16/21   Historical Provider, MD   furosemide (LASIX) 20 MG tablet Take 1 tablet by mouth daily  Patient taking differently: Take 20 mg by mouth daily as needed 12/4/20   Madeline Poon MD       Allergies:  Patient has no known allergies. Social History:    Social History     Socioeconomic History    Marital status: Single     Spouse name: Not on file    Number of children: Not on file    Years of education: Not on file    Highest education level: Not on file   Occupational History    Not on file   Tobacco Use    Smoking status: Every Day     Packs/day: 0.50     Years: 35.00     Pack years: 17.50     Types: Cigarettes    Smokeless tobacco: Never   Vaping Use    Vaping Use: Every day   Substance and Sexual Activity    Alcohol use: Yes     Comment: occ    Drug use: No    Sexual activity: Yes     Partners: Male   Other Topics Concern    Not on file   Social History Narrative    Not on file     Social Determinants of Health     Financial Resource Strain: Not on file   Food Insecurity: Not on file   Transportation Needs: Not on file   Physical Activity: Not on file   Stress: Not on file   Social Connections: Not on file   Intimate Partner Violence: Not on file   Housing Stability: Not on file       TOBACCO:   reports that she has been smoking cigarettes. She has a 17.50 pack-year smoking history. She has never used smokeless tobacco.  ETOH:   reports current alcohol use.     REVIEW OF SYSTEMS:    Vital: BP (!) 149/69   Pulse 92   Temp 98.4 °F (36.9 °C) (Oral)   Resp 16   Ht 5' 5\" (1.651 m)   Wt 169 lb 15.6 oz (77.1 kg)   LMP 04/04/2010   SpO2 92%   BMI 28.29 kg/m²   Constitutional: no fever, no night sweats, no fatigue  Head: no headache, no head injury, no migranes. Eye: no blurring of vision, no double vision. Ears: no hearing difficulty, no tinnitus  Mouth/throat: no ulceration, dental caries, dysphagia  Lungs: no cough, no shortness of breath, no wheeze  CVS: no palpitation, no chest pain, no shortness of breath  GI: no abdominal pain, no nausea , no vomiting, no constipation  JAS: no dysuria, frequency and urgency, no hematuria, no kidney stones  Musculoskeletal:back pain  Endocrine: no polyuria, polydypsia, no cold or heat intolerence  Hematology: no anemia, no easy brusing or bleeding, no hx of clotting disorder  Dermatology: no skin rash, no eczema, no prurities,  Psychiatry: no depression,  anxiety,no panic attacks, no suicide ideation  Neurology: no syncope, no seizures, no numbness or tingling of hands, no numbness or tingling of feet, no paresis      PHYSICAL EXAM:  General:  Awake, alert, not in distress. Appears to be stated age. HEENT: Atraumatic, normocephalic. PERRLA. EOM intact. Pink conjunctiva, anicteric sclera. Pink and moist oral mucosa. No lymphadenopathy. Trachea midline. Thyroid non palpable. Neck supple. No carotid bruit. No JVD. Chest: Bilateal air entry, Clear to auscultation, no wheezing, rhonchi or rales. Cardiovascular: RRR, C7I8, no murmur, click, rub or gallop. No lower extremity edema. Pedal and posterior tibialis 2+. Abdomen: Soft, non tender to palpation. Active bowel sounds x 4 quadrants. Musculoskeletal: Limitation of the rom of the joints and LSS  SLR is positive on the affected side  No gross weak ness appreciated  CNS: Oriented to person, place and time. CXR:   CT CHEST PULMONARY EMBOLISM W CONTRAST   Final Result   1. No acute pulmonary embolism. 2. Bilateral lower lung consolidative changes typical appearance for   atelectasis. Pneumonia is a consideration. 3.  Pulmonary sequela typical of that seen with smoking, including COPD. 4. Thin linear bands of platelike atelectasis lateral basal segment right   lower lobe and lateral inferior lingula left upper lobe. 5. Incompletely visualized left adrenal nodule almost certainly reflects an   adenoma (benign finding requiring no additional evaluation or follow-up) . XR CHEST (2 VW)   Final Result   No radiographic evidence of an acute cardiopulmonary process. EKG:  I have reviewed the EKG. CBC   Recent Labs     10/17/22  0217   WBC 8.4   HGB 14.8   HCT 43.9         RENAL  Recent Labs     10/17/22  0217   *   K 4.6   CL 95*   CO2 25   BUN 31*   CREATININE 1.7*     LFT'S  Recent Labs     10/17/22  0217   AST 22   ALT 14   BILITOT 0.3   ALKPHOS 80     COAG  No results for input(s): INR in the last 72 hours.   CARDIAC ENZYMES  Recent Labs     10/17/22  0217 10/17/22  1431   CKTOTAL 954*  --    TROPONINI 0.02* <0.01       U/A:    Lab Results   Component Value Date/Time    COLORU Yellow 10/17/2022 10:58 AM    CLARITYU Clear 10/17/2022 10:58 AM    SPECGRAV 1.032 10/17/2022 10:58 AM    LEUKOCYTESUR Negative 10/17/2022 10:58 AM    BLOODU Negative 10/17/2022 10:58 AM    GLUCOSEU Negative 10/17/2022 10:58 AM       ABG    Lab Results   Component Value Date/Time    LRQ6BXZ 26.6 10/17/2022 03:00 PM    BEART 0.9 10/17/2022 03:00 PM    Y4MQVOOE 88.1 10/17/2022 03:00 PM    PHART 7.374 10/17/2022 03:00 PM    APD6XVQ 45.6 10/17/2022 03:00 PM    PO2ART 52.4 10/17/2022 03:00 PM    IZL4WHS 28.0 10/17/2022 03:00 PM           Active Hospital Problems    Diagnosis Date Noted    CHARLY (acute kidney injury) ([facility] [address] [N17.9] 10/17/2022     Priority: Medium    Acute respiratory failure with hypoxia and hypercapnia (HCC) [J96.01, J96.02] 10/17/2022     Priority: Medium           ASSESSMENT/PLAN:  Acute resp failure- see orders  Copd- cont current meds  Anxiety  Htn-monitor  Ch back pain- stable    Pt is stable. Discussed with staff and pt about the plan. See the orders for further plan. Will proceed further acc to pt's progress.     Electronically signed by Chris Brasher MD on 10/17/2022 at 10:22 PM

## 2022-10-18 NOTE — PROGRESS NOTES
Patient resting quietly in bed. Alert and oriented. Denies pain. Few bites of breakfast eaten. Ambulates independently in room. IV fluids infusing. Morning medications given without difficulty. Call light and belongings within reach. Will continue to monitor.      Electronically signed by Chari Guzman RN on 10/18/2022 at 9:07 AM

## 2022-11-10 NOTE — DISCHARGE SUMMARY
Hospitalist Discharge Summary   11/10/2022 8:17 AM  Subjective:   Admit Date: 10/17/2022  PCP: Christine Vergara MD  Interval History: pt is ready for the discharge  Feels better  Rest of the events as in progress notes and chart  Admitted with shortness of breath  Diagnosed with acute resp failure and copd exacerbation  Denies any other complaints  Chart reviewed. Diet: No diet orders on file  Medications:   Scheduled Meds:  Continuous Infusions:  CBC: No results for input(s): WBC, HGB, PLT in the last 72 hours. BMP:  No results for input(s): NA, K, CL, CO2, BUN, CREATININE, GLUCOSE in the last 72 hours. Hepatic: No results for input(s): AST, ALT, ALB, BILITOT, ALKPHOS in the last 72 hours. Troponin: No results for input(s): TROPONINI in the last 72 hours. BNP: No results for input(s): BNP in the last 72 hours. Lipids: No results for input(s): CHOL, HDL in the last 72 hours. Invalid input(s): LDLCALCU  INR: No results for input(s): INR in the last 72 hours. Orders Placed This Encounter   Procedures    COVID-19, Rapid     Standing Status:   Standing     Number of Occurrences:   1     Order Specific Question:   Is this test for diagnosis or screening? Answer:   Diagnosis of ill patient     Order Specific Question:   Symptomatic for COVID-19 as defined by CDC? Answer:   Yes     Order Specific Question:   Date of Symptom Onset     Answer:   10/17/2022     Order Specific Question:   Hospitalized for COVID-19? Answer:   No     Order Specific Question:   Admitted to ICU for COVID-19? Answer:   No     Order Specific Question:   Employed in healthcare setting? Answer:   No     Order Specific Question:   Resident in a congregate (group) care setting? Answer:   No     Order Specific Question:   Pregnant?      Answer:   No    XR CHEST (2 VW)     Standing Status:   Standing     Number of Occurrences:   1     Order Specific Question:   Reason for exam:     Answer:   sob    CT CHEST PULMONARY EMBOLISM W CONTRAST     Standing Status:   Standing     Number of Occurrences:   1     Order Specific Question:   Reason for exam:     Answer:   dyspnea    CBC with Auto Differential     Standing Status:   Standing     Number of Occurrences:   1    CMP w/ Reflex to MG     Standing Status:   Standing     Number of Occurrences:   1    ETOH     Standing Status:   Standing     Number of Occurrences:   1    Ammonia     Standing Status:   Standing     Number of Occurrences:   1    Lipase     Standing Status:   Standing     Number of Occurrences:   1    Troponin     Standing Status:   Standing     Number of Occurrences:   1    Lactic Acid     Standing Status:   Standing     Number of Occurrences:   1    Brain Natriuretic Peptide     Standing Status:   Standing     Number of Occurrences:   1    Blood Gas, Venous     Standing Status:   Standing     Number of Occurrences:   1    CK     Standing Status:   Standing     Number of Occurrences:   1    Blood Gas, Arterial     Standing Status:   Standing     Number of Occurrences:   1    Troponin     Standing Status:   Standing     Number of Occurrences:   1    Urinalysis with Reflex to Culture     Standing Status:   Standing     Number of Occurrences:   1    Urine Drug Screen     Standing Status:   Standing     Number of Occurrences:   1    Comprehensive Metabolic Panel     Standing Status:   Standing     Number of Occurrences:   1    Procalcitonin     Standing Status:   Standing     Number of Occurrences:   1    Inpatient consult to Primary Care Provider     Standing Status:   Standing     Number of Occurrences:   1     Order Specific Question:   Reason for Consult? Answer:   sob, too     Order Specific Question:   Provider Contacted?      Answer:   No     Order Specific Question:   When to contact consulting provider     Answer:   As soon as possible    POCT Glucose     Standing Status:   Standing     Number of Occurrences:   1    EKG 12 Lead     Standing Status:   Standing Number of Occurrences:   1     Order Specific Question:   Reason for Exam?     Answer:   Irregular heart rate    EKG Rhythm Strip     Standing Status:   Standing     Number of Occurrences:   1    EKG Rhythm Strip     Standing Status:   Standing     Number of Occurrences:   1    EKG Rhythm Strip     Standing Status:   Standing     Number of Occurrences:   1    EKG Rhythm Strip     Standing Status:   Standing     Number of Occurrences:   1    ADMIT TO INPATIENT     Standing Status:   Standing     Number of Occurrences:   1     Order Specific Question:   Discharge Plan:     Answer:   Home with Office Follow-up     Order Specific Question:   Telemetry/Cardiac Monitoring Required? Answer:   Yes     Order Specific Question:   Bed request comments     Answer:   med surg    Discharge patient     Standing Status:   Standing     Number of Occurrences:   1     Order Specific Question:   Discharge Disposition     Answer:   Home       No current facility-administered medications for this encounter. Current Outpatient Medications   Medication Sig Dispense Refill    pregabalin (LYRICA) 150 MG capsule Take 1 capsule by mouth 2 times daily for 30 days. 60 capsule 0    mometasone-formoterol (DULERA) 200-5 MCG/ACT inhaler Inhale 2 puffs into the lungs in the morning and 2 puffs in the evening. 1 each 2    ipratropium-albuterol (DUONEB) 0.5-2.5 (3) MG/3ML SOLN nebulizer solution Inhale 3 mLs into the lungs 4 times daily 360 mL 2    pregabalin (LYRICA) 150 MG capsule Take 300 mg by mouth at bedtime.       meloxicam (MOBIC) 15 MG tablet TAKE ONE TABLET BY MOUTH DAILY AS NEEDED (Patient taking differently: Take 15 mg by mouth daily) 30 tablet 2    metoprolol succinate (TOPROL XL) 100 MG extended release tablet TAKE ONE TABLET BY MOUTH DAILY 90 tablet 1    vitamin D (ERGOCALCIFEROL) 1.25 MG (96035 UT) CAPS capsule 1 tab po q wk 4 capsule 5    albuterol sulfate HFA (PROAIR HFA) 108 (90 Base) MCG/ACT inhaler INHALE TWO PUFFS BY MOUTH FOUR TIMES A DAY AS NEEDED FOR WHEEZING 8.5 g 2    furosemide (LASIX) 20 MG tablet Take 1 tablet by mouth daily 30 tablet 3       Orders Placed This Encounter   Medications    aspirin tablet 325 mg    ipratropium-albuterol (DUONEB) nebulizer solution 1 ampule     Order Specific Question:   Initiate RT Bronchodilator Protocol     Answer:   No    predniSONE (DELTASONE) tablet 60 mg    0.9 % sodium chloride bolus    DISCONTD: 0.45 % sodium chloride infusion    DISCONTD: ipratropium-albuterol (DUONEB) nebulizer solution 1 ampule     Order Specific Question:   Initiate RT Bronchodilator Protocol     Answer:   Yes - Inpatient Protocol    DISCONTD: famotidine (PEPCID) tablet 20 mg    DISCONTD: enoxaparin (LOVENOX) injection 40 mg     Order Specific Question:   Indication of Use     Answer:   Prophylaxis-DVT/PE    DISCONTD: mometasone-formoterol (DULERA) 200-5 MCG/ACT inhaler 2 puff    DISCONTD: pregabalin (LYRICA) capsule 100 mg    DISCONTD: 0.9 % sodium chloride infusion    iopamidol (ISOVUE-370) 76 % injection 75 mL    DISCONTD: levoFLOXacin (LEVAQUIN) 250 MG/50ML infusion 250 mg     Order Specific Question:   Antimicrobial Indications     Answer:   Pneumonia (CAP)     Order Specific Question:   CAP duration of therapy     Answer:   7 days    mometasone-formoterol (DULERA) 200-5 MCG/ACT inhaler     Sig: Inhale 2 puffs into the lungs in the morning and 2 puffs in the evening.      Dispense:  1 each     Refill:  2    levoFLOXacin (LEVAQUIN) 500 MG tablet     Sig: Take 1 tablet by mouth daily for 7 days     Dispense:  7 tablet     Refill:  0    ipratropium-albuterol (DUONEB) 0.5-2.5 (3) MG/3ML SOLN nebulizer solution     Sig: Inhale 3 mLs into the lungs 4 times daily     Dispense:  360 mL     Refill:  2           Objective:   Vitals: /64   Pulse 92   Temp 98.6 °F (37 °C) (Oral)   Resp 18   Ht 5' 5\" (1.651 m)   Wt 169 lb 15.6 oz (77.1 kg)   LMP 04/04/2010   SpO2 93%   BMI 28.29 kg/m²   General appearance: alert,awake,oriented x 3 and cooperative with exam  HEENT: Head: Normal, normocephalic, atraumatic, anicteric, pupils are reactive to light. Dry mucous membrane. Neck: no adenopathy, no carotid bruit, supple, symmetrical, trachea midline and thyroid not enlarged, symmetric, no tenderness/mass/nodules  Lungs: clear  Heart: regular rate and rhythm, S1, S2 normal, no murmur, click, rub or gallop  Abdomen: soft, non-tender; bowel sounds normal; no masses,  no organomegaly  Extremities: extremities normal, Neurologic: Mental status: Alert, oriented, thought content appropriate    Assessment and Plan:   Acute resp failure-better  Copd exacerbation  Anxiety  Htn    Patient Active Problem List:     Failed back surgical syndrome     Encounter for therapeutic drug monitoring     Lumbar radiculopathy     Chronic pain syndrome     Encounter for long-term opiate analgesic use     Lumbosacral spondylosis without myelopathy     CHARLY (acute kidney injury) ([address]     Acute respiratory failure with hypoxia and hypercapnia ([address]    Pt is stable. Discussed with staff and pt about the plan. See the orders for further plan. Will proceed further acc to pt's progress. Time spent with management of the pt is- 20 mts  Follow up in office in 1 wk. See the [address] [place] and Med rec forms  Follow up with specialists as instructed by them. Advised the pt to call me in case of questions or worsening of symptoms. Pt voiced understanding of the instructions.   Condition and prognosis is guarded      Electronically signed by: Brittany Ernst MD, on 11/10/2022, at 8:17 AM

## 2024-06-07 PROBLEM — J44.9 CHRONIC OBSTRUCTIVE PULMONARY DISEASE, UNSPECIFIED COPD TYPE (HCC): Status: ACTIVE | Noted: 2024-06-07

## 2024-06-07 PROBLEM — J44.9 CHRONIC OBSTRUCTIVE PULMONARY DISEASE, UNSPECIFIED (HCC): Status: ACTIVE | Noted: 2024-06-07

## 2024-07-01 ENCOUNTER — OFFICE VISIT (OUTPATIENT)
Dept: ORTHOPEDIC SURGERY | Age: 64
End: 2024-07-01
Payer: MEDICARE

## 2024-07-01 VITALS — WEIGHT: 186 LBS | HEIGHT: 65 IN | BODY MASS INDEX: 30.99 KG/M2

## 2024-07-01 DIAGNOSIS — M17.11 OSTEOARTHRITIS OF RIGHT KNEE, UNSPECIFIED OSTEOARTHRITIS TYPE: ICD-10-CM

## 2024-07-01 DIAGNOSIS — M25.561 CHRONIC PAIN OF RIGHT KNEE: Primary | ICD-10-CM

## 2024-07-01 DIAGNOSIS — G89.29 CHRONIC PAIN OF RIGHT KNEE: Primary | ICD-10-CM

## 2024-07-01 PROCEDURE — 20610 DRAIN/INJ JOINT/BURSA W/O US: CPT | Performed by: PHYSICIAN ASSISTANT

## 2024-07-01 PROCEDURE — 99213 OFFICE O/P EST LOW 20 MIN: CPT | Performed by: PHYSICIAN ASSISTANT

## 2024-07-01 RX ORDER — BUPIVACAINE HYDROCHLORIDE 2.5 MG/ML
2 INJECTION, SOLUTION INFILTRATION; PERINEURAL ONCE
Status: COMPLETED | OUTPATIENT
Start: 2024-07-01 | End: 2024-07-01

## 2024-07-01 RX ORDER — TRIAMCINOLONE ACETONIDE 40 MG/ML
40 INJECTION, SUSPENSION INTRA-ARTICULAR; INTRAMUSCULAR ONCE
Status: COMPLETED | OUTPATIENT
Start: 2024-07-01 | End: 2024-07-01

## 2024-07-01 RX ADMIN — TRIAMCINOLONE ACETONIDE 40 MG: 40 INJECTION, SUSPENSION INTRA-ARTICULAR; INTRAMUSCULAR at 15:09

## 2024-07-01 RX ADMIN — BUPIVACAINE HYDROCHLORIDE 5 MG: 2.5 INJECTION, SOLUTION INFILTRATION; PERINEURAL at 15:09

## 2024-07-03 NOTE — PROGRESS NOTES
This dictation was done with Dragon dictation and may contain mechanical errors related to translation.    I have today reviewed with Kathy Schwab the clinically relevant, past medical history, medications, allergies, family history, social history, and Review Of Systems form the patient’s most recent history form & I have documented any details relevant to today's presenting complaints in my history below. Ms. Kathy Schwab's self-reported past medical history, medications, allergies, family history, social history, and Review Of Systems form has been scanned into the chart under the \"Media\" tab.    Subjective:  Kathy Schwab is a 64 y.o. who is here as an established patient complaining of right knee pain over the last 4 weeks.  She is taken some anti-inflammatories but at different times remember specific injury or any previous problems with this right knee.  She rates a 6 out of 10 pain.  I had seen her 2 years ago.  At today's visit she was sent for an AP lateral sunrise view and tunnel view of her right knee.  She talks about having hard time with prolonged standing twisting pivoting and squatting.      Patient Active Problem List   Diagnosis    Failed back surgical syndrome    Encounter for therapeutic drug monitoring    Lumbar radiculopathy    Chronic pain syndrome    Encounter for long-term opiate analgesic use    Lumbosacral spondylosis without myelopathy    CHARLY (acute kidney injury) (HCC)    Acute respiratory failure with hypoxia and hypercapnia (HCC)    Chronic obstructive pulmonary disease, unspecified COPD type (HCC)    Chronic obstructive pulmonary disease, unspecified           Current Outpatient Medications on File Prior to Visit   Medication Sig Dispense Refill    pregabalin (LYRICA) 150 MG capsule TAKE ONE CAPSULE BY MOUTH EVERY MORNING, ONE AT NOON, AND ONE AT BEDTIME 90 capsule 2    methylPREDNISolone (MEDROL, STAN,) 4 MG tablet Take by mouth. 1 kit 0    mometasone-formoterol (DULERA) 200-5 MCG/ACT

## 2024-07-25 ENCOUNTER — OFFICE VISIT (OUTPATIENT)
Dept: ORTHOPEDIC SURGERY | Age: 64
End: 2024-07-25
Payer: MEDICARE

## 2024-07-25 VITALS — RESPIRATION RATE: 16 BRPM | WEIGHT: 186 LBS | HEIGHT: 65 IN | BODY MASS INDEX: 30.99 KG/M2

## 2024-07-25 DIAGNOSIS — M25.561 CHRONIC PAIN OF RIGHT KNEE: Primary | ICD-10-CM

## 2024-07-25 DIAGNOSIS — G89.29 CHRONIC PAIN OF RIGHT KNEE: Primary | ICD-10-CM

## 2024-07-25 PROCEDURE — 99213 OFFICE O/P EST LOW 20 MIN: CPT | Performed by: ORTHOPAEDIC SURGERY

## 2024-07-26 NOTE — PROGRESS NOTES
[facility]  Office Visit    Chief Complaint: Right knee pain    HPI:  Kathy Schwab is a 64 y.o. who is here in follow-up of right knee pain.  She was last seen on July 1, 2024, which time she was treated with a steroid injection.  This helped relieve pain for 4 to 5 days.  She comes in today with 8/10 pain in the medial knee.  She walks with use of a cane.  Pain is worse with weightbearing activities.  She had no issues with the knee until recently.  She denies hip pain.      Past Medical History:   Diagnosis Date    Hypertension     Wears glasses     reading        ROS:  Constitutional: denies fever, chills, weight loss  MSK: denies pain in other joints, muscle aches  Neurological: denies numbness, tingling, weakness    Exam:  Appearance: sitting in exam room chair, appears to be in no acute distress, awake and alert  Resp: unlabored breathing on room air  Skin: warm, dry and intact with out erythema or significant increased temperature  Neuro: grossly intact both lower extremities. Intact sensation to light touch. Motor exam 4+ to 5/5 in all major motor groups.  RLE: Negative Stinchfield exam at the hip.  Tender over the medial femoral condyle.  She demonstrates active knee range of motion 0 to 120 degrees.  There is no knee effusion.  Negative Casey examination.  Motor function and sensation intact distally.    Imaging:  Prior right knee radiographs were reviewed and demonstrates mild joint space narrowing with no fractures or dislocations.  There are no significant periarticular osteophytes.    Assessment:  Right knee pain due to probable femoral condyle insufficiency fracture versus medial meniscus tear    Plan:  We discussed the diagnosis and treatment options.  I recommended limiting her weightbearing activities and wear a knee brace, as well as continuing NSAIDs until her symptoms improve.  We discussed obtaining an MRI to confirm medial femoral condyle insufficiency

## 2024-07-29 ENCOUNTER — TELEPHONE (OUTPATIENT)
Dept: ORTHOPEDIC SURGERY | Age: 64
End: 2024-07-29

## 2024-07-29 NOTE — TELEPHONE ENCOUNTER
[facility] Clinical Support  MRI RIGHT KNEE - NO PRECERT REQ  PER [facility] ONLINE  REF# [id number]    I called the patient and left a message

## 2024-08-26 ENCOUNTER — TELEPHONE (OUTPATIENT)
Dept: ORTHOPEDIC SURGERY | Age: 64
End: 2024-08-26

## 2024-08-26 NOTE — TELEPHONE ENCOUNTER
PATIENT CALLED IN TO ADVISE SHE IS STILL EXPERIENCING TROUBLE WITH HER KNEE     THE PATIENT CAN BE REACHED [phone]